# Patient Record
Sex: MALE | Race: ASIAN | NOT HISPANIC OR LATINO | Employment: FULL TIME | URBAN - METROPOLITAN AREA
[De-identification: names, ages, dates, MRNs, and addresses within clinical notes are randomized per-mention and may not be internally consistent; named-entity substitution may affect disease eponyms.]

---

## 2017-01-05 DIAGNOSIS — R06.02 SHORTNESS OF BREATH ON EXERTION: Primary | ICD-10-CM

## 2017-01-06 DIAGNOSIS — R06.02 SOB (SHORTNESS OF BREATH): Primary | ICD-10-CM

## 2017-01-18 LAB
DLCOUNC-%PRED-PRE: 90 %
DLCOUNC-PRE: 27.27 ML/MIN/MMHG
DLCOUNC-PRED: 30.11 ML/MIN/MMHG
ERV-%PRED-PRE: 88 %
ERV-PRE: 1.18 L
ERV-PRED: 1.34 L
EXPTIME-PRE: 8.36 SEC
FEF2575-%PRED-PRE: 90 %
FEF2575-PRE: 2.96 L/SEC
FEF2575-PRED: 3.28 L/SEC
FEFMAX-%PRED-PRE: 108 %
FEFMAX-PRE: 10.27 L/SEC
FEFMAX-PRED: 9.43 L/SEC
FEV1-%PRED-PRE: 96 %
FEV1-PRE: 3.32 L
FEV1FEV6-PRE: 79 %
FEV1FEV6-PRED: 81 %
FEV1FVC-PRE: 78 %
FEV1FVC-PRED: 80 %
FEV1SVC-PRE: 78 %
FEV1SVC-PRED: 71 %
FIFMAX-PRE: 2.77 L/SEC
FRCPLETH-%PRED-PRE: 83 %
FRCPLETH-PRE: 2.83 L
FRCPLETH-PRED: 3.39 L
FVC-%PRED-PRE: 98 %
FVC-PRE: 4.24 L
FVC-PRED: 4.31 L
IC-%PRED-PRE: 86 %
IC-PRE: 3.08 L
IC-PRED: 3.55 L
RVPLETH-%PRED-PRE: 78 %
RVPLETH-PRE: 1.65 L
RVPLETH-PRED: 2.11 L
TLCPLETH-%PRED-PRE: 87 %
TLCPLETH-PRE: 5.91 L
TLCPLETH-PRED: 6.72 L
VA-%PRED-PRE: 79 %
VA-PRE: 5.13 L
VC-%PRED-PRE: 87 %
VC-PRE: 4.26 L
VC-PRED: 4.89 L

## 2017-01-23 DIAGNOSIS — R06.83 SNORING: Primary | ICD-10-CM

## 2017-02-02 ENCOUNTER — OFFICE VISIT (OUTPATIENT)
Dept: SLEEP MEDICINE | Facility: CLINIC | Age: 47
End: 2017-02-02
Attending: NURSE PRACTITIONER
Payer: COMMERCIAL

## 2017-02-02 VITALS
SYSTOLIC BLOOD PRESSURE: 121 MMHG | HEART RATE: 73 BPM | BODY MASS INDEX: 24.57 KG/M2 | HEIGHT: 68 IN | RESPIRATION RATE: 16 BRPM | WEIGHT: 162.1 LBS | DIASTOLIC BLOOD PRESSURE: 76 MMHG | OXYGEN SATURATION: 98 %

## 2017-02-02 DIAGNOSIS — R06.83 SNORING: Primary | ICD-10-CM

## 2017-02-02 DIAGNOSIS — J31.0 OTHER RHINITIS: ICD-10-CM

## 2017-02-02 DIAGNOSIS — G47.8 UNHEALTHY SLEEP HABIT: ICD-10-CM

## 2017-02-02 DIAGNOSIS — F51.12 INSUFFICIENT SLEEP SYNDROME: ICD-10-CM

## 2017-02-02 PROCEDURE — 40000809 ZZH STATISTIC NO DOCUMENTATION TO SUPPORT CHARGE

## 2017-02-02 PROCEDURE — 99211 OFF/OP EST MAY X REQ PHY/QHP: CPT | Mod: ZF

## 2017-02-02 NOTE — NURSING NOTE
"Chief Complaint   Patient presents with     Consult     Snoring, possible sleep studt       Initial /76 mmHg  Pulse 73  Resp 16  Ht 1.734 m (5' 8.25\")  Wt 73.528 kg (162 lb 1.6 oz)  BMI 24.45 kg/m2  SpO2 98% Estimated body mass index is 24.45 kg/(m^2) as calculated from the following:    Height as of this encounter: 1.734 m (5' 8.25\").    Weight as of this encounter: 73.528 kg (162 lb 1.6 oz).  BP completed using cuff size: large  Left arm  Neck 38cm    Nigel CMA      "

## 2017-02-02 NOTE — PROGRESS NOTES
DATE OF VISIT:  02/02/2017.      CHIEF COMPLAINT:  Dr. Deluca has requested consultation for  Mr. Penelope Capone  for difficulties with snoring.      HISTORY OF PRESENT ILLNESS:  The patient reports years of a history with snoring and presents here today wondering if he would qualify for a mouth guard.      PAST MEDICAL HISTORY:  Previously studied at our lab on 09/28/2012 with a polysomnogram.  That study did show a weight of 156 pounds, an AHI of 1.8, a supine AHI of 5.9, an RDI of 17, a RERA index of 17.9.  PLMs were noted with a PLM index of 8.2.  This study was notable for increased N3 sleep pressure evidenced by repetitive entry into N3 sleep with 39.3% of the night's rest in N3 sleep.  Snoring was mild to moderate.  O2 sats were normal.  This study was a negative sleep study.      Currently the patient reports entering bed somewhere between 11:00 and 11:30 after having previously put his 3-year-old to bed earlier in the evening; about the same schedule on weekends.  On a rare occasion he may get to bed after midnight, but only if he has had a nap.  Awakens quite frequently at 6:00 a.m. while he is closely sleeping with his 3-year-old and then he is up for the day.  On occasion he may awaken earlier.  Also, outlier includes on rare occasion can quite easily go to bed early with his 3-year-old and sleeps throughout the night.  Total sleep time is estimated probably 6-1/2 hours for most nights, sometimes 6, feels better if he gets 8.  Will nap once a week for about 30 minutes and if he does nap, being a natural night owl, will go to bed a little bit later.      No RLS.  Main distraction, however, is his 3-year old can move around a fair amount at night awakening him.  Snoring does persist 7 nights a week.  Sometimes it is loud.  It is more loud when alcohol is congested prior to bedtime or if he is on his back.  There has been minimal reports of witnessed apneas.  He does not sleep with his wife on a regular  basis as to whether that has worsened over time.  He has a rare snort arousal, maybe once every 3 months, and a dry throat in the morning, some nasal stuffiness.  Prefers to sleep on his back.  No signs of orexin deficiency.  On occasion will have sleep paralysis, however.      SOCIAL AND PERSONAL HISTORY:  He is , lives with wife and 2 girls, a baby and a 3-year-old.  He is a professor at the [employer].  Sleep environment as mentioned before, may be disruptive to sleeping due to close quarters with his 3-year-old.  No family history of sleep abnormalities.  Does use alcohol to help fall asleep with a bourbon prior to bedtime and during his relaxing time after putting his child to bed might have a couple of bottles of beer as well.  These do occur within 3-4 hours of bedtime.  Caffeine, but minimal intake.  His [place] Sleepiness Score is 12-13.  He denies any effects on his performance with work or difficulties with driving.  If he does do a long drive; however, he may have some daytime sleepiness.  No tiredness or fatigue during the day or any problems with mental health.       REVIEW OF SYSTEMS:  A 14-point review of systems completed and negative with the exception of some nasal stuffiness and mucous in the morning after snoring at night.      Allergies:    Allergies   Allergen Reactions     Food      White wine.     Shellfish Allergy      Adhesive Tape Itching and Rash       Medications:    No current outpatient prescriptions on file.       Problem List:  Patient Active Problem List    Diagnosis Date Noted     Left arm numbness 06/26/2015     CTS (carpal tunnel syndrome) 12/12/2014     Preventative health care 07/20/2014     Psoriasis 03/05/2012     Hives 03/05/2012        Past Medical/Surgical History:  Past Medical History   Diagnosis Date     Tinea cruris      Urticaria      Dysplastic nevus      2015     No past surgical history on file.        Physical Examination:  Vitals: /76 mmHg  Pulse 73   "Resp 16  Ht 1.734 m (5' 8.25\")  Wt 73.528 kg (162 lb 1.6 oz)  BMI 24.45 kg/m2  SpO2 98%  BMI= Body mass index is 24.45 kg/(m^2).      [place] Total Score 2/2/2017   Total score - [place] 13       GENERAL APPEARANCE: healthy, alert and no distress     ASSESSMENT AND PLAN:  It is my impression that Mr. Capone still presents a case of snoring and likely infrequent apneas, worse when he is supine.  Elevated [place] Sleepiness Score but denies difficulties with cognitive performance at work; impact from late night alcohol prior to bedtime, insufficient sleep, along with co-sleeping with a 3 year old likely playing a major role here.  Weight up by 6 pounds from previous study, unlikely making an impact on the degree of sleep disordered breathing. His greatest current concern is travel and sharing bed with spouse with loud snoring. The following plan of care has been developed:   1.  Snoring, possible worsening of sleep apnea, but it is difficult to determine at this point without significant fragmented sleep and in the setting of insufficient sleep and impact of sleep habits.  I have recommended that he do a trial for 2 months period of time, eliminating alcohol within 3-4 hours of bedtime and enhancing his total sleep time to adult norms between 7-9 hours.  He is to MyChart me in 2 months. He's provided with a referral to Dr. Perez for consideration of a travel dental appliance.  2.  Insufficient sleep by his history today and likely the answer for increased slow wave pressure at his last sleep study.  I have recommended that he increase total sleep time.   3.  Rhinitis.  I have instructed him in saline lavages.           Thank you for allowing me to participate in this kind man's care.      Time spent with patient 60 minutes, of which greater than 50% was spent in counseling, education and coordination of care.         DENZEL SHARPE, CNP             D: 02/02/2017 11:27   T: 02/02/2017 11:56   MT: BRANDON    "   Name:     PHILLIP BLUE   MRN:      [id number]        Account:      [id number]   :      1970           Visit Date:   2017      Document: [id number]

## 2017-02-02 NOTE — PATIENT INSTRUCTIONS
Positioning Device  Positioning devices are generally used when sleep apnea is mild and only occurs on your back.This example shows a pillow that straps around the waist. It may be appropriate for those whose sleep study shows milder sleep apnea that occurs primarily when lying flat on one's back. Preliminary studies have shown benefit but effectiveness at home may need to be verified by a home sleep test. These devices are generally not covered by medical insurance.                ebay or backpack w/ chest strap stuffed w/ pillow or t shirt 2  pockets sew in tennis balls      Also, avoid alcohol within 3-4 hrs of bedtime    Use saline spray product (ocean complete or arm and hammer are easy to use) in shower where nose naturally opens up. Use another time of day with continue congestion over the sink.    My chart me in 2 months.     Ricardo Perez DDS, for travel appliance for snoring        Your BMI is Body mass index is 24.45 kg/(m^2).  Weight management is a personal decision.  If you are interested in exploring weight loss strategies, the following discussion covers the approaches that may be successful. Body mass index (BMI) is one way to tell whether you are at a healthy weight, overweight, or obese. It measures your weight in relation to your height.  A BMI of 18.5 to 24.9 is in the healthy range. A person with a BMI of 25 to 29.9 is considered overweight, and someone with a BMI of 30 or greater is considered obese. More than two-thirds of American adults are considered overweight or obese.  Being overweight or obese increases the risk for further weight gain. Excess weight may lead to heart disease and diabetes.  Creating and following plans for healthy eating and physical activity may help you improve your health.  Weight control is part of healthy lifestyle and includes exercise, emotional health, and healthy eating habits. Careful eating habits lifelong are the mainstay of weight control. Though there  are significant health benefits from weight loss, long-term weight loss with diet alone may be very difficult to achieve- studies show long-term success with dietary management in less than 10% of people. Attaining a healthy weight may be especially difficult to achieve in those with severe obesity. In some cases, medications, devices and surgical management might be considered.  What can you do?  If you are overweight or obese and are interested in methods for weight loss, you should discuss this with your provider.     Consider reducing daily calorie intake by 500 calories.     Keep a food journal.     Avoiding skipping meals, consider cutting portions instead.    Diet combined with exercise helps maintain muscle while optimizing fat loss. Strength training is particularly important for building and maintaining muscle mass. Exercise helps reduce stress, increase energy, and improves fitness. Increasing exercise without diet control, however, may not burn enough calories to loose weight.       Start walking three days a week 10-20 minutes at a time    Work towards walking thirty minutes five days a week     Eventually, increase the speed of your walking for 1-2 minutes at time    In addition, we recommend that you review healthy lifestyles and methods for weight loss available through the National Institutes of Health patient information sites:  http://win.niddk.nih.gov/publications/index.htm    And look into health and wellness programs that may be available through your health insurance provider, employer, local community center, or kang club.

## 2017-03-16 ENCOUNTER — PRE VISIT (OUTPATIENT)
Dept: PULMONOLOGY | Facility: CLINIC | Age: 47
End: 2017-03-16

## 2017-03-16 NOTE — TELEPHONE ENCOUNTER
1.  Date/reason for appt: 3/29/17 - Shortness of breath (PFT and chest XR prior)  2.  Referring provider: Dr. Tyron Vides  3.  Call to patient (Yes / No - short description): no, recs in epic  4.  Previous care at:   - [facility]   - PFT on 1/6/17 (in [facility])

## 2017-03-29 ENCOUNTER — OFFICE VISIT (OUTPATIENT)
Dept: PULMONOLOGY | Facility: CLINIC | Age: 47
End: 2017-03-29
Attending: INTERNAL MEDICINE
Payer: COMMERCIAL

## 2017-03-29 VITALS
DIASTOLIC BLOOD PRESSURE: 88 MMHG | HEART RATE: 61 BPM | HEIGHT: 68 IN | RESPIRATION RATE: 18 BRPM | BODY MASS INDEX: 24.71 KG/M2 | SYSTOLIC BLOOD PRESSURE: 133 MMHG | TEMPERATURE: 98 F | WEIGHT: 163 LBS | OXYGEN SATURATION: 97 %

## 2017-03-29 DIAGNOSIS — R06.02 SOB (SHORTNESS OF BREATH): Primary | ICD-10-CM

## 2017-03-29 DIAGNOSIS — R06.02 SOB (SHORTNESS OF BREATH): ICD-10-CM

## 2017-03-29 PROCEDURE — 99212 OFFICE O/P EST SF 10 MIN: CPT | Mod: ZF

## 2017-03-29 ASSESSMENT — PAIN SCALES - GENERAL: PAINLEVEL: NO PAIN (0)

## 2017-03-29 NOTE — NURSING NOTE
Chief Complaint   Patient presents with     Consult     New consult on Tetsuya and his results     Eugene Lerma CMA at 2:33 PM on 3/29/2017

## 2017-03-29 NOTE — LETTER
"3/29/2017       RE: Penelope Capone  [address]  [facility] [address]     Dear Colleague,    Thank you for referring your patient, Penelope Capoen, to the [facility] at [facility]. Please see a copy of my visit note below.              Pulmonary Clinic  New Patient Evaluation    Name: Penelope Capone MRN: [id number]     Age: 47 year old   YOB: 1970                 HPI:   CC: \"Concern for silicosis\"    Penelope Capone is a 47 year old male in generally good health presents to discuss pulmonary silicosis.    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 He's noted that he occasionally has a tight feeling in his chest after breathing in sawdust. This is not associated with dyspnea, cough, or wheezing. Wood is not his primary medium in his exposure to sawdust is in frequent. He does wear a mask when working with wood, which is generally plywood. He also mentions that his studio has high-end industrial air filters to minimize dust levels.  His primary media is Keron, and he does have frequent exposure to dust from dried keron. No other recognized exposures.  He also states that he feels short of breath after running a few blocks, though he notes that he is not very active and this is likely due to deconditioning.    He says that he is overall minimally concerned for lung disease but one of his colleagues was recently diagnosed with silicosis and has substantial respiratory trouble so Mr. Capone wanted to be checked out to ensure that he is not at risk.    Mr. Capone has smoked one third to one half pack per day for 20 years, he quit 10 years ago.    He has a history of positive PPD status post nine months of isoniazid therapy. He did receive the BCG vaccine is a child so it is unclear if this PPD was a true positive.             Past Medical History:     Past Medical History:   Diagnosis Date     Dysplastic nevus     2015     " Tinea cruris      Urticaria              Past Surgical History:      Past Surgical History:   Procedure Laterality Date     NO HISTORY OF SURGERY               Social History:     Social History     Social History     Marital status:      Spouse name: N/A     Number of children: N/A     Years of education: N/A     Occupational History     Not on file.     Social History Main Topics     Smoking status: Former Smoker     Packs/day: 0.30     Years: 18.00     Types: Cigarettes     Quit date: 1/1/2006     Smokeless tobacco: Never Used     Alcohol use Yes      Comment: 2-3 per day     Drug use: No     Sexual activity: Yes     Partners: Female     Birth control/ protection: Condom     Other Topics Concern     Parent/Sibling W/ Cabg, Mi Or Angioplasty Before 65f 55m? No     Social History Narrative              Family History:     Family History   Problem Relation Age of Onset     CANCER No family hx of      no skin cancer             Immunizations:     Immunization History   Administered Date(s) Administered     Influenza (IIV3) 11/10/2005     Influenza Vaccine IM 3yrs+ 4 Valent IIV4 11/14/2013, 10/20/2015     Influenza Vaccine, 3 YRS +, IM (QUADRIVALENT W/PRESERVATIVES) 10/27/2014     MMR 08/30/2001     TD (ADULT, 7+) 04/12/1996     Tdap (Adacel,Boostrix) 05/18/2010             Allergies:     Allergies   Allergen Reactions     Food      White wine.     Shellfish Allergy      Adhesive Tape Itching and Rash             Medications:     Not on any medicatons         Review of Systems:     Review of Systems   Constitutional: Negative for chills, fever and weight loss.   HENT: Negative for congestion.    Eyes: Negative.    Respiratory: Negative for cough, sputum production, shortness of breath and wheezing.    Cardiovascular: Negative for chest pain and leg swelling.   Gastrointestinal: Negative for heartburn and nausea.   Genitourinary: Negative for dysuria.   Musculoskeletal: Negative.    Skin: Negative for rash.  "  Neurological: Negative for loss of consciousness and headaches.   Endo/Heme/Allergies: Does not bruise/bleed easily.              Exam:   /88  Pulse 61  Temp 98  F (36.7  C) (Oral)  Resp 18  Ht 1.734 m (5' 8.25\")  Wt 73.9 kg (163 lb)  SpO2 97%  BMI 24.6 kg/m2    Physical Exam   Constitutional: He is oriented to person, place, and time and well-developed, well-nourished, and in no distress. No distress.   HENT:   Head: Normocephalic and atraumatic.   Right Ear: External ear normal.   Left Ear: External ear normal.   Mouth/Throat: No oropharyngeal exudate.   Eyes: Conjunctivae and EOM are normal. Pupils are equal, round, and reactive to light.   Neck: Normal range of motion.   Cardiovascular: Normal rate and regular rhythm.    No murmur heard.  Pulmonary/Chest: Effort normal. He has no wheezes. He has no rales.   Abdominal: Soft.   Musculoskeletal: Normal range of motion. He exhibits no edema.   Lymphadenopathy:     He has no cervical adenopathy.   Neurological: He is alert and oriented to person, place, and time.   Skin: Skin is warm and dry.   Nursing note and vitals reviewed.             Data:     PFT 3/29/2017        Interpretation by me: Normal airflow. Normal DLCO      CXR: 3/29/17  Images personally reviewed.  Radiologist Read: No acute cardiopulmonary findings.         Assessment and Plan:     Penelope Capone is a 47 year old male in generally good health presents to discuss pulmonary silicosis.    ## Concern for Occupational Lung Disease    Mr. Capone notes occasional chest tightness after breathing a lot of sawdust. This doesn't really cause any problems, though due to an older colleague who is also an artist who works with wood and ti being diagnosed with silicosis, Mr. Capone was concerned that this feeling could be a sign of silicosis. He has no other respiratory symptoms. He does note mild dyspnea when he tries to run, though he is not a runner and is not very active overall so this is " not unexpected. His PFTs are completely normal as it is a chest x-ray. These results were reviewed with Mr. Capone. I explained that chest CT would be more accurate than chest x-ray to assess silicosis or other inhalation a lung disease, however given his normal PFTs and lack of symptoms I do not think further imaging is warranted. He agreed with this.  In summary there are no signs or symptoms of any lung disease at this time. His mild dyspnea with strenuous exertion is normal for an inactive person. He's already taking appropriate respiratory precautions by wearing a mask when working with sawdust or doing anything that will generate ti dust and has a high performance air filtration system in his studio.        Patient staffed with Dr. Adria Mares M.D.  Pulmonary & Critical Care Fellow  [phone]    Attending statement:    The patient was seen and examined by me with the pulmonary fellow, Dr Mares.  The case was discussed at length.  Vitals, lab results and imaging from our visit were reviewed.  The note written by Dr Mares above reflects our joint assessment and plan.    Alexander Covington MD    Sincerely,    Bi Mares MD

## 2017-03-29 NOTE — MR AVS SNAPSHOT
"              After Visit Summary   3/29/2017    Penelope Capone    MRN: [id number]           Patient Information     Date Of Birth          1970        Visit Information        Provider Department      3/29/2017 2:10 PM Bi Mares MD [facility]         Follow-ups after your visit        Follow-up notes from your care team     Return if symptoms worsen or fail to improve.      Who to contact     If you have questions or need follow up information about today's clinic visit or your schedule please contact [facility] L[facility] directly at [phone].  Normal or non-critical lab and imaging results will be communicated to you by MyChart, letter or phone within 4 business days after the clinic has received the results. If you do not hear from us within 7 days, please contact the clinic through [email or web address] or phone. If you have a critical or abnormal lab result, we will notify you by phone as soon as possible.  Submit refill requests through [employer] or call your pharmacy and they will forward the refill request to us. Please allow 3 business days for your refill to be completed.          Additional Information About Your Visit        MyChart Information     [employer] gives you secure access to your electronic health record. If you see a primary care provider, you can also send messages to your care team and make appointments. If you have questions, please call your primary care clinic.  If you do not have a primary care provider, please call [phone] and they will assist you.        Care EveryWhere ID     This is your Care EveryWhere ID. This could be used by other organizations to access your [place] medical records  [id number]        Your Vitals Were     Pulse Temperature Respirations Height Pulse Oximetry BMI (Body Mass Index)    61 98  F (36.7  C) (Oral) 18 1.734 m (5' 8.25\") 97% 24.6 kg/m2       Blood Pressure from Last 3 Encounters:   03/29/17 " 133/88   02/02/17 121/76   12/14/15 119/77    Weight from Last 3 Encounters:   03/29/17 73.9 kg (163 lb)   02/02/17 73.5 kg (162 lb 1.6 oz)   12/14/15 70.8 kg (156 lb 0.6 oz)              Today, you had the following     No orders found for display       Primary Care Provider Office Phone # Fax #    Tyron Vides [phone]       [address] [address]        Thank you!     Thank you for choosing [facility]  for your care. Our goal is always to provide you with excellent care. Hearing back from our patients is one way we can continue to improve our services. Please take a few minutes to complete the written survey that you may receive in the mail after your visit with us. Thank you!             Your Updated Medication List - Protect others around you: Learn how to safely use, store and throw away your medicines at www.disposemymeds.org.      Notice  As of 3/29/2017  3:29 PM    You have not been prescribed any medications.

## 2017-03-29 NOTE — PROGRESS NOTES
"          Pulmonary Clinic  New Patient Evaluation    Name: Penelope Capone MRN: [id number]     Age: 47 year old   YOB: 1970                 HPI:   CC: \"Concern for silicosis\"    Penelope Capone is a 47 year old male in generally good health presents to discuss pulmonary silicosis.    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 He's noted that he occasionally has a tight feeling in his chest after breathing in sawdust. This is not associated with dyspnea, cough, or wheezing. Wood is not his primary medium in his exposure to sawdust is in frequent. He does wear a mask when working with wood, which is generally plywood. He also mentions that his studio has high-end industrial air filters to minimize dust levels.  His primary media is Keron, and he does have frequent exposure to dust from dried keron. No other recognized exposures.  He also states that he feels short of breath after running a few blocks, though he notes that he is not very active and this is likely due to deconditioning.    He says that he is overall minimally concerned for lung disease but one of his colleagues was recently diagnosed with silicosis and has substantial respiratory trouble so Mr. Capone wanted to be checked out to ensure that he is not at risk.    Mr. Capone has smoked one third to one half pack per day for 20 years, he quit 10 years ago.    He has a history of positive PPD status post nine months of isoniazid therapy. He did receive the BCG vaccine is a child so it is unclear if this PPD was a true positive.             Past Medical History:     Past Medical History:   Diagnosis Date     Dysplastic nevus     2015     Tinea cruris      Urticaria              Past Surgical History:      Past Surgical History:   Procedure Laterality Date     NO HISTORY OF SURGERY               Social History:     Social History     Social History     Marital status:      Spouse name: N/A     Number of children: N/A     Years of " "education: N/A     Occupational History     Not on file.     Social History Main Topics     Smoking status: Former Smoker     Packs/day: 0.30     Years: 18.00     Types: Cigarettes     Quit date: 1/1/2006     Smokeless tobacco: Never Used     Alcohol use Yes      Comment: 2-3 per day     Drug use: No     Sexual activity: Yes     Partners: Female     Birth control/ protection: Condom     Other Topics Concern     Parent/Sibling W/ Cabg, Mi Or Angioplasty Before 65f 55m? No     Social History Narrative              Family History:     Family History   Problem Relation Age of Onset     CANCER No family hx of      no skin cancer             Immunizations:     Immunization History   Administered Date(s) Administered     Influenza (IIV3) 11/10/2005     Influenza Vaccine IM 3yrs+ 4 Valent IIV4 11/14/2013, 10/20/2015     Influenza Vaccine, 3 YRS +, IM (QUADRIVALENT W/PRESERVATIVES) 10/27/2014     MMR 08/30/2001     TD (ADULT, 7+) 04/12/1996     Tdap (Adacel,Boostrix) 05/18/2010             Allergies:     Allergies   Allergen Reactions     Food      White wine.     Shellfish Allergy      Adhesive Tape Itching and Rash             Medications:     Not on any medicatons         Review of Systems:     Review of Systems   Constitutional: Negative for chills, fever and weight loss.   HENT: Negative for congestion.    Eyes: Negative.    Respiratory: Negative for cough, sputum production, shortness of breath and wheezing.    Cardiovascular: Negative for chest pain and leg swelling.   Gastrointestinal: Negative for heartburn and nausea.   Genitourinary: Negative for dysuria.   Musculoskeletal: Negative.    Skin: Negative for rash.   Neurological: Negative for loss of consciousness and headaches.   Endo/Heme/Allergies: Does not bruise/bleed easily.              Exam:   /88  Pulse 61  Temp 98  F (36.7  C) (Oral)  Resp 18  Ht 1.734 m (5' 8.25\")  Wt 73.9 kg (163 lb)  SpO2 97%  BMI 24.6 kg/m2    Physical Exam "   Constitutional: He is oriented to person, place, and time and well-developed, well-nourished, and in no distress. No distress.   HENT:   Head: Normocephalic and atraumatic.   Right Ear: External ear normal.   Left Ear: External ear normal.   Mouth/Throat: No oropharyngeal exudate.   Eyes: Conjunctivae and EOM are normal. Pupils are equal, round, and reactive to light.   Neck: Normal range of motion.   Cardiovascular: Normal rate and regular rhythm.    No murmur heard.  Pulmonary/Chest: Effort normal. He has no wheezes. He has no rales.   Abdominal: Soft.   Musculoskeletal: Normal range of motion. He exhibits no edema.   Lymphadenopathy:     He has no cervical adenopathy.   Neurological: He is alert and oriented to person, place, and time.   Skin: Skin is warm and dry.   Nursing note and vitals reviewed.             Data:     PFT 3/29/2017        Interpretation by me: Normal airflow. Normal DLCO      CXR: 3/29/17  Images personally reviewed.  Radiologist Read: No acute cardiopulmonary findings.         Assessment and Plan:     Penelope Capone is a 47 year old male in generally good health presents to discuss pulmonary silicosis.    ## Concern for Occupational Lung Disease    Mr. Capone notes occasional chest tightness after breathing a lot of sawdust. This doesn't really cause any problems, though due to an older colleague who is also an artist who works with wood and ti being diagnosed with silicosis, Mr. Capone was concerned that this feeling could be a sign of silicosis. He has no other respiratory symptoms. He does note mild dyspnea when he tries to run, though he is not a runner and is not very active overall so this is not unexpected. His PFTs are completely normal as it is a chest x-ray. These results were reviewed with Mr. Capone. I explained that chest CT would be more accurate than chest x-ray to assess silicosis or other inhalation a lung disease, however given his normal PFTs and lack of symptoms I do  not think further imaging is warranted. He agreed with this.  In summary there are no signs or symptoms of any lung disease at this time. His mild dyspnea with strenuous exertion is normal for an inactive person. He's already taking appropriate respiratory precautions by wearing a mask when working with sawdust or doing anything that will generate ti dust and has a high performance air filtration system in his studio.        Patient staffed with Dr. Adria Mares M.D.  Pulmonary & Critical Care Fellow  [phone]    Attending statement:    The patient was seen and examined by me with the pulmonary fellow, Dr Mares.  The case was discussed at length.  Vitals, lab results and imaging from our visit were reviewed.  The note written by Dr Mares above reflects our joint assessment and plan.    Alexander Covington MD

## 2017-04-07 ASSESSMENT — ENCOUNTER SYMPTOMS
NAUSEA: 0
SPUTUM PRODUCTION: 0
SHORTNESS OF BREATH: 0
CHILLS: 0
DYSURIA: 0
COUGH: 0
HEADACHES: 0
WHEEZING: 0
HEARTBURN: 0
WEIGHT LOSS: 0
BRUISES/BLEEDS EASILY: 0
FEVER: 0
LOSS OF CONSCIOUSNESS: 0
EYES NEGATIVE: 1
MUSCULOSKELETAL NEGATIVE: 1

## 2017-04-19 LAB
DLCOUNC-%PRED-PRE: 101 %
DLCOUNC-PRE: 30.38 ML/MIN/MMHG
DLCOUNC-PRED: 30.06 ML/MIN/MMHG
ERV-%PRED-PRE: 84 %
ERV-PRE: 1.16 L
ERV-PRED: 1.38 L
EXPTIME-PRE: 7.87 SEC
FEF2575-%PRED-PRE: 95 %
FEF2575-PRE: 3.11 L/SEC
FEF2575-PRED: 3.27 L/SEC
FEFMAX-%PRED-PRE: 115 %
FEFMAX-PRE: 10.91 L/SEC
FEFMAX-PRED: 9.42 L/SEC
FEV1-%PRED-PRE: 99 %
FEV1-PRE: 3.44 L
FEV1FEV6-PRE: 79 %
FEV1FEV6-PRED: 81 %
FEV1FVC-PRE: 79 %
FEV1FVC-PRED: 80 %
FEV1SVC-PRE: 81 %
FEV1SVC-PRED: 71 %
FIFMAX-PRE: 6.53 L/SEC
FRCPLETH-%PRED-PRE: 86 %
FRCPLETH-PRE: 2.95 L
FRCPLETH-PRED: 3.39 L
FVC-%PRED-PRE: 101 %
FVC-PRE: 4.35 L
FVC-PRED: 4.3 L
IC-%PRED-PRE: 87 %
IC-PRE: 3.07 L
IC-PRED: 3.51 L
RVPLETH-%PRED-PRE: 84 %
RVPLETH-PRE: 1.78 L
RVPLETH-PRED: 2.12 L
TLCPLETH-%PRED-PRE: 89 %
TLCPLETH-PRE: 6.02 L
TLCPLETH-PRED: 6.72 L
VA-%PRED-PRE: 88 %
VA-PRE: 5.67 L
VC-%PRED-PRE: 86 %
VC-PRE: 4.23 L
VC-PRED: 4.89 L

## 2017-04-26 ENCOUNTER — OFFICE VISIT (OUTPATIENT)
Dept: FAMILY MEDICINE | Facility: CLINIC | Age: 47
End: 2017-04-26

## 2017-04-26 VITALS
HEART RATE: 63 BPM | WEIGHT: 162 LBS | TEMPERATURE: 97.6 F | DIASTOLIC BLOOD PRESSURE: 87 MMHG | SYSTOLIC BLOOD PRESSURE: 127 MMHG | OXYGEN SATURATION: 99 % | BODY MASS INDEX: 24.45 KG/M2

## 2017-04-26 DIAGNOSIS — B35.6 TINEA CRURIS: Primary | ICD-10-CM

## 2017-04-26 RX ORDER — CICLOPIROX OLAMINE 7.7 MG/G
CREAM TOPICAL 2 TIMES DAILY
Qty: 30 G | Refills: 1 | Status: SHIPPED | OUTPATIENT
Start: 2017-04-26 | End: 2022-04-25

## 2017-04-26 NOTE — NURSING NOTE
"Chief Complaint   Patient presents with     RECHECK     Patient is coming in today wanting to make sure that he has jock itch and nothing else - redness, itching, swelling - started while in [place] April 10-13       Initial /87 (BP Location: Right arm, Cuff Size: Adult Regular)  Pulse 63  Temp 97.6  F (36.4  C) (Oral)  Wt 162 lb (73.5 kg)  SpO2 99%  BMI 24.45 kg/m2 Estimated body mass index is 24.45 kg/(m^2) as calculated from the following:    Height as of 3/29/17: 5' 8.25\" (173.4 cm).    Weight as of this encounter: 162 lb (73.5 kg).  Medication Reconciliation: complete     Marry Son CMA      "

## 2017-04-26 NOTE — PROGRESS NOTES
Penelope Capone is a 47 year old male here for the following issues:    Rash  Penelope is a 48 yo male here for evaluation of groin rash. In the past he has tried gentian blue in the past, but this time it did not help. He reports that he had traveled to [place] 2 wks ago and it was very hot there. He did a lot of walking and was sweating. Then he noted onset of redness in his groin and itching. Axillae were also affected. No fevers. No urinary symptoms, no concern for STDs.     Concern for celiac disease  He reports getting diarrhea with ingestion of pasta, breads  Can eat bread that is made with malt instead of yeast. He is not currently ingesting gluten.    Patient Active Problem List   Diagnosis     Psoriasis     Hives     Preventative health care     CTS (carpal tunnel syndrome)     Left arm numbness       No current outpatient prescriptions on file.       Allergies   Allergen Reactions     Food      White wine.     Shellfish Allergy      Adhesive Tape Itching and Rash        EXAM  /87 (BP Location: Right arm, Cuff Size: Adult Regular)  Pulse 63  Temp 97.6  F (36.4  C) (Oral)  Wt 162 lb (73.5 kg)  SpO2 99%  BMI 24.45 kg/m2  Gen: Alert, pleasant, NAD  : generalized erythema at inguinal creases and erythematous satellite lesions adjacent to this area. At base of penis is erythematous open lesion measuring about quarter size. Clear serosanguinous drainage   Axillae: confluent erythema at both axillae. No change in color under blue light    Assessment:  (B35.6) Tinea cruris  (primary encounter diagnosis)  Comment: open lesion at base of penis. Erythema in inguinal creases, satellite lesions, suspect fungal infection, discussed concern for infection but he declines oral antibiotics  Plan: ciclopirox (LOPROX) 0.77 % cream        Recommend topical bacitracin over open area, use ciclopirox bid at creases and at axilla. Contact MD if not improving as I would consider oral antibiotics.     Patient Instructions    First use Bacitracin (antibiotic ointment) 2-3 x per day  Also buy 1% hydrocortisone ointment and apply twice daily to open area    Start Ciclopirox cream twice daily on Sat /Sunday    Genesis Brantley MD  Internal Medicine/Pediatrics

## 2017-04-26 NOTE — PATIENT INSTRUCTIONS
First use Bacitracin (antibiotic ointment) 2-3 x per day  Also buy 1% hydrocortisone ointment and apply twice daily to open area    Start Ciclopirox cream twice daily on Sat /Sunday

## 2018-01-14 ENCOUNTER — MYC MEDICAL ADVICE (OUTPATIENT)
Dept: FAMILY MEDICINE | Facility: CLINIC | Age: 48
End: 2018-01-14

## 2018-01-14 DIAGNOSIS — Z12.11 SCREENING FOR COLON CANCER: Primary | ICD-10-CM

## 2018-01-15 ENCOUNTER — TELEPHONE (OUTPATIENT)
Dept: GASTROENTEROLOGY | Facility: CLINIC | Age: 48
End: 2018-01-15

## 2018-01-15 DIAGNOSIS — Z12.11 ENCOUNTER FOR SCREENING COLONOSCOPY: Primary | ICD-10-CM

## 2018-01-15 NOTE — TELEPHONE ENCOUNTER
We can go ahead and order this for him.     Normally, I might try to discuss options with him (like a FIT card), but he's pretty nervous about his health.     So, an order is fine.     Thanks!     Tyron        Order placed for screening colonoscopy at [facility]

## 2018-01-15 NOTE — TELEPHONE ENCOUNTER
Patient scheduled for colonoscopy    Indication for procedure. screening    Referring Provider. Dr. Vides, [facility]    ? no    Arrival time verified? Yes 12 noon    Facility location verified? [address] [place] [address]    Instructions given regarding prep and procedure    Prep Type golytley    Are you taking any anticoagulants or blood thinners? no    Instructions given? Yes, verbally and email    Electronic implanted devices? no    Pre procedure teaching completed? Yes    Transportation from procedure? Yes, wife    H&P / Pre op physical completed? Na    Kathie Rodriguez RN

## 2018-01-23 ENCOUNTER — SURGERY (OUTPATIENT)
Age: 48
End: 2018-01-23

## 2018-01-23 ENCOUNTER — HOSPITAL ENCOUNTER (OUTPATIENT)
Facility: AMBULATORY SURGERY CENTER | Age: 48
End: 2018-01-23
Attending: INTERNAL MEDICINE
Payer: COMMERCIAL

## 2018-01-23 VITALS
TEMPERATURE: 97.7 F | HEIGHT: 69 IN | RESPIRATION RATE: 16 BRPM | DIASTOLIC BLOOD PRESSURE: 67 MMHG | OXYGEN SATURATION: 97 % | SYSTOLIC BLOOD PRESSURE: 138 MMHG

## 2018-01-23 RX ORDER — FENTANYL CITRATE 50 UG/ML
INJECTION, SOLUTION INTRAMUSCULAR; INTRAVENOUS PRN
Status: DISCONTINUED | OUTPATIENT
Start: 2018-01-23 | End: 2018-01-23 | Stop reason: HOSPADM

## 2018-01-23 RX ORDER — SODIUM CHLORIDE, SODIUM LACTATE, POTASSIUM CHLORIDE, CALCIUM CHLORIDE 600; 310; 30; 20 MG/100ML; MG/100ML; MG/100ML; MG/100ML
INJECTION, SOLUTION INTRAVENOUS CONTINUOUS
Status: DISCONTINUED | OUTPATIENT
Start: 2018-01-23 | End: 2018-01-24 | Stop reason: HOSPADM

## 2018-01-23 RX ORDER — ONDANSETRON 2 MG/ML
4 INJECTION INTRAMUSCULAR; INTRAVENOUS
Status: DISCONTINUED | OUTPATIENT
Start: 2018-01-23 | End: 2018-01-24 | Stop reason: HOSPADM

## 2018-01-23 RX ORDER — LIDOCAINE 40 MG/G
CREAM TOPICAL
Status: DISCONTINUED | OUTPATIENT
Start: 2018-01-23 | End: 2018-01-24 | Stop reason: HOSPADM

## 2018-01-23 RX ADMIN — FENTANYL CITRATE 50 MCG: 50 INJECTION, SOLUTION INTRAMUSCULAR; INTRAVENOUS at 13:25

## 2018-01-23 NOTE — DISCHARGE INSTRUCTIONS
Discharge Instructions after Colonoscopy or Sigmoidoscopy       Today you had a ____ Colonoscopy        Activity and Diet   You were given medicine for pain. You may be dizzy or sleepy.   For 24 hours:     Do not drive or use heavy equipment.     Do not make important decisions.     Do not drink any alcohol.   You may return to your normal diet and medicines.       Discomfort     Air was placed in your colon during the exam in order to see it. Walking helps to pass the air.     You may take Tylenol (acetaminophen) for pain unless your doctor has told you not to.   Do not take aspirin or ibuprofen (Advil, Motrin, or other anti-inflammatory   drugs) for _____ days.       Follow-up   ____ We took small tissue samples or polyps to study. Your doctor will call you with the results within two weeks.       When to call:       Call right away if you have:     Unusual pain in belly or chest pain not relieved with passing air.     More than 1 to 2 Tablespoons of bleeding from your rectum.     Fever above 100.6  F (37.5  C).       If you have severe pain, bleeding, or shortness of breath, go to an emergency room.       If you have questions, call:   Monday to Friday, 7 a.m. to 4:30 p.m.   Endoscopy: [phone] (We may have to call you back)       After hours   Hospital: [phone] (Ask for the GI fellow on call)

## 2018-01-23 NOTE — IP AVS SNAPSHOT
MRN:[id number]                      After Visit Summary   1/23/2018    Penelope Capone    MRN: [id number]           Thank you!     Thank you for choosing [facility] for your care. Our goal is always to provide you with excellent care. Hearing back from our patients is one way we can continue to improve our services. Please take a few minutes to complete the written survey that you may receive in the mail after you visit with us. Thank you!        Patient Information     Date Of Birth          1970        About your hospital stay     You were admitted on:  January 23, 2018 [facility]    You were discharged on:  January 23, 2018       Who to Call     For medical emergencies, please call 911.  For non-urgent questions about your medical care, please call your primary care provider or clinic, [phone]  For questions related to your surgery, please call your surgery clinic        Attending Provider     Provider Antonia Fulton MD Gastroenterology       Primary Care Provider Office Phone # Fax #    Tyron Vides [phone]      Further instructions from your care team       Discharge Instructions after Colonoscopy or Sigmoidoscopy       Today you had a ____ Colonoscopy        Activity and Diet   You were given medicine for pain. You may be dizzy or sleepy.   For 24 hours:     Do not drive or use heavy equipment.     Do not make important decisions.     Do not drink any alcohol.   You may return to your normal diet and medicines.       Discomfort     Air was placed in your colon during the exam in order to see it. Walking helps to pass the air.     You may take Tylenol (acetaminophen) for pain unless your doctor has told you not to.   Do not take aspirin or ibuprofen (Advil, Motrin, or other anti-inflammatory   drugs) for _____ days.       Follow-up   ____ We took small tissue samples or polyps to study.  "Your doctor will call you with the results within two weeks.       When to call:       Call right away if you have:     Unusual pain in belly or chest pain not relieved with passing air.     More than 1 to 2 Tablespoons of bleeding from your rectum.     Fever above 100.6  F (37.5  C).       If you have severe pain, bleeding, or shortness of breath, go to an emergency room.       If you have questions, call:   Monday to Friday, 7 a.m. to 4:30 p.m.   Endoscopy: [phone] (We may have to call you back)       After hours   Hospital: [phone] (Ask for the GI fellow on call)     Pending Results     No orders found from 1/21/2018 to 1/24/2018.            Admission Information     Date & Time Provider Department Dept. Phone    1/23/2018 Antonia Adam MD [facility] [phone]      Your Vitals Were     Blood Pressure Temperature Respirations Height Pulse Oximetry       138/67 97.7  F (36.5  C) (Temporal) 16 1.76 m (5' 9.29\") 97%       MyChart Information     [employer] gives you secure access to your electronic health record. If you see a primary care provider, you can also send messages to your care team and make appointments. If you have questions, please call your primary care clinic.  If you do not have a primary care provider, please call [phone] and they will assist you.      [employer] is an electronic gateway that provides easy, online access to your medical records. With [employer], you can request a clinic appointment, read your test results, renew a prescription or communicate with your care team.     To access your existing account, please contact your [facility] or call [phone] for assistance.        Care EveryWhere ID     This is your Care EveryWhere ID. This could be used by other organizations to access your [facility] medical records  [id number]        Equal Access to Services     MONA SEN AH: roger Quispe " dilcia friedmanblas morissalas, keturah mkkate brockhilda ah. So [facility] [phone].    ATENCIÓN: Si pamela moore, tiene a ayers disposición servicios gratuitos de asistencia lingüística. Llame al [phone].    We comply with applicable federal civil rights laws and [place] laws. We do not discriminate on the basis of race, color, national origin, age, disability, sex, sexual orientation, or gender identity.               Review of your medicines      UNREVIEWED medicines. Ask your doctor about these medicines        Dose / Directions    ciclopirox 0.77 % cream   Commonly known as:  LOPROX   Used for:  Tinea cruris        Apply topically 2 times daily   Quantity:  30 g   Refills:  1                Protect others around you: Learn how to safely use, store and throw away your medicines at www.disposemymeds.org.             Medication List: This is a list of all your medications and when to take them. Check marks below indicate your daily home schedule. Keep this list as a reference.      Medications           Morning Afternoon Evening Bedtime As Needed    ciclopirox 0.77 % cream   Commonly known as:  LOPROX   Apply topically 2 times daily

## 2018-01-23 NOTE — IP AVS SNAPSHOT
[facility]    [address] [place] [address]    Phone:  [phone]    Fax:  [phone]                                       After Visit Summary   1/23/2018    Penelope Capone    MRN: [id number]           After Visit Summary Signature Page     I have received my discharge instructions, and my questions have been answered. I have discussed any challenges I see with this plan with the nurse or doctor.    ..........................................................................................................................................  Patient/Patient Representative Signature      ..........................................................................................................................................  Patient Representative Print Name and Relationship to Patient    ..................................................               ................................................  Date                                            Time    ..........................................................................................................................................  Reviewed by Signature/Title    ...................................................              ..............................................  Date                                                            Time

## 2018-01-24 LAB — COLONOSCOPY: NORMAL

## 2018-06-24 ENCOUNTER — HOSPITAL ENCOUNTER (EMERGENCY)
Facility: CLINIC | Age: 48
Discharge: HOME | End: 2018-06-25
Attending: EMERGENCY MEDICINE | Admitting: EMERGENCY MEDICINE
Payer: COMMERCIAL

## 2018-06-24 DIAGNOSIS — L50.9 HIVES: ICD-10-CM

## 2018-06-24 PROCEDURE — 96375 TX/PRO/DX INJ NEW DRUG ADDON: CPT | Performed by: EMERGENCY MEDICINE

## 2018-06-24 PROCEDURE — 99284 EMERGENCY DEPT VISIT MOD MDM: CPT | Mod: 25 | Performed by: EMERGENCY MEDICINE

## 2018-06-24 PROCEDURE — 96374 THER/PROPH/DIAG INJ IV PUSH: CPT | Performed by: EMERGENCY MEDICINE

## 2018-06-24 PROCEDURE — 25000128 H RX IP 250 OP 636: Performed by: EMERGENCY MEDICINE

## 2018-06-24 PROCEDURE — 99283 EMERGENCY DEPT VISIT LOW MDM: CPT | Mod: Z6 | Performed by: EMERGENCY MEDICINE

## 2018-06-24 RX ORDER — METHYLPREDNISOLONE SODIUM SUCCINATE 125 MG/2ML
125 INJECTION, POWDER, LYOPHILIZED, FOR SOLUTION INTRAMUSCULAR; INTRAVENOUS ONCE
Status: COMPLETED | OUTPATIENT
Start: 2018-06-24 | End: 2018-06-24

## 2018-06-24 RX ORDER — DIPHENHYDRAMINE HYDROCHLORIDE 50 MG/ML
50 INJECTION INTRAMUSCULAR; INTRAVENOUS ONCE
Status: COMPLETED | OUTPATIENT
Start: 2018-06-24 | End: 2018-06-24

## 2018-06-24 RX ADMIN — METHYLPREDNISOLONE SODIUM SUCCINATE 125 MG: 125 INJECTION, POWDER, FOR SOLUTION INTRAMUSCULAR; INTRAVENOUS at 23:25

## 2018-06-24 RX ADMIN — DIPHENHYDRAMINE HYDROCHLORIDE 50 MG: 50 INJECTION, SOLUTION INTRAMUSCULAR; INTRAVENOUS at 23:21

## 2018-06-24 NOTE — ED AVS SNAPSHOT
[facility], Emergency Department    [address]    [place] [address]    Phone:  [phone]    Fax:  [phone]                                       Penelope Capone   MRN: [id number]    Department:  [facility], Emergency Department   Date of Visit:  6/24/2018           Patient Information     Date Of Birth          1970        Your diagnoses for this visit were:     Hives        You were seen by Micah Loja MD.        Discharge Instructions       Please make an appointment to follow up with Your Primary Care Provider if not improving.    Benadryl for itching rash.    Medrol Dosepak as directed.  Fill and start this tomorrow.    Return to the emergency department for any problems.      Discharge References/Attachments     HIVES (URTICARIA) UNDERSTANDING (ENGLISH)      24 Hour Appointment Hotline       To make an appointment at any [facility] clinic, call [phone] ([phone]). If you don't have a family doctor or clinic, we will help you find one. [facility] clinics are conveniently located to serve the needs of you and your family.             Review of your medicines      START taking        Dose / Directions Last dose taken    EPINEPHrine 0.3 MG/0.3ML injection 2-pack   Commonly known as:  EPIPEN/ADRENACLICK/or ANY BX GENERIC EQUIV   Dose:  0.3 mg   Quantity:  0.6 mL        Inject 0.3 mLs (0.3 mg) into the muscle once as needed for anaphylaxis   Refills:  1        methylPREDNISolone 4 MG tablet   Commonly known as:  MEDROL DOSEPAK   Quantity:  21 tablet        Follow package instructions   Refills:  0          Our records show that you are taking the medicines listed below. If these are incorrect, please call your family doctor or clinic.        Dose / Directions Last dose taken    ciclopirox 0.77 % cream   Commonly known as:  LOPROX   Quantity:  30 g        Apply topically 2 times daily   Refills:  1        CLARITIN PO   Dose:  1 tablet        Take 1 tablet by mouth as  needed   Refills:  0                Prescriptions were sent or printed at these locations (2 Prescriptions)                   Other Prescriptions                Printed at Department/Unit printer (2 of 2)         EPINEPHrine (EPIPEN/ADRENACLICK/OR ANY BX GENERIC EQUIV) 0.3 MG/0.3ML injection 2-pack               methylPREDNISolone (MEDROL DOSEPAK) 4 MG tablet                Orders Needing Specimen Collection     None      Pending Results     No orders found for last 3 day(s).            Pending Culture Results     No orders found for last 3 day(s).            Pending Results Instructions     If you had any lab results that were not finalized at the time of your Discharge, you can call the ED Lab Result RN at [phone]. You will be contacted by this team for any positive Lab results or changes in treatment. The nurses are available 7 days a week from 10A to 6:30P.  You can leave a message 24 hours per day and they will return your call.        Thank you for choosing [facility]       Thank you for choosing [facility] for your care. Our goal is always to provide you with excellent care. Hearing back from our patients is one way we can continue to improve our services. Please take a few minutes to complete the written survey that you may receive in the mail after you visit with us. Thank you!        [employer] Information     [employer] gives you secure access to your electronic health record. If you see a primary care provider, you can also send messages to your care team and make appointments. If you have questions, please call your primary care clinic.  If you do not have a primary care provider, please call [phone] and they will assist you.        Care EveryWhere ID     This is your Care EveryWhere ID. This could be used by other organizations to access your [facility] medical records  [id number]        Equal Access to Services     MONA SEN : roger Quispe, dilcia ramirez  keturah sanches ah. So [facility] [phone].    ATENCIÓN: Si habla español, tiene a ayers disposición servicios gratuitos de asistencia lingüística. Llame al [phone].    We comply with applicable federal civil rights laws and [place] laws. We do not discriminate on the basis of race, color, national origin, age, disability, sex, sexual orientation, or gender identity.            After Visit Summary       This is your record. Keep this with you and show to your community pharmacist(s) and doctor(s) at your next visit.

## 2018-06-24 NOTE — ED AVS SNAPSHOT
[facility], [place], Emergency Department    [address]    [facility] [address]    Phone:  [phone]    Fax:  [phone]                                       Penelope Capone   MRN: [id number]    Department:  [facility], Emergency Department   Date of Visit:  6/24/2018           After Visit Summary Signature Page     I have received my discharge instructions, and my questions have been answered. I have discussed any challenges I see with this plan with the nurse or doctor.    ..........................................................................................................................................  Patient/Patient Representative Signature      ..........................................................................................................................................  Patient Representative Print Name and Relationship to Patient    ..................................................               ................................................  Date                                            Time    ..........................................................................................................................................  Reviewed by Signature/Title    ...................................................              ..............................................  Date                                                            Time

## 2018-06-25 VITALS
BODY MASS INDEX: 23.48 KG/M2 | SYSTOLIC BLOOD PRESSURE: 128 MMHG | TEMPERATURE: 98.1 F | OXYGEN SATURATION: 97 % | HEART RATE: 75 BPM | DIASTOLIC BLOOD PRESSURE: 95 MMHG | RESPIRATION RATE: 16 BRPM | WEIGHT: 160.31 LBS

## 2018-06-25 RX ORDER — METHYLPREDNISOLONE 4 MG
TABLET, DOSE PACK ORAL
Qty: 21 TABLET | Refills: 0 | Status: SHIPPED | OUTPATIENT
Start: 2018-06-25 | End: 2022-04-25

## 2018-06-25 RX ORDER — EPINEPHRINE 0.3 MG/.3ML
0.3 INJECTION SUBCUTANEOUS
Qty: 0.6 ML | Refills: 1 | Status: SHIPPED | OUTPATIENT
Start: 2018-06-25 | End: 2024-08-16

## 2018-06-25 NOTE — ED PROVIDER NOTES
[facility] [place]    6/24/18     ED 3 11:02 PM   History     Chief Complaint   Patient presents with     Hives     Onset 40 minutes ago with hives on extremities and trunk, red hands, itching, denies resp distress, took a claritin prior to arrival.     The history is provided by the patient and medical records.     Penelope Capone is a 48 year old male who presents with hives on his trunk and upper extremities.  He has a history of food allergies, particularly shellfish allergies.  Patient states that he has had 2 prior episodes of hives.  The first time occurred while he was living in [facility] and was quite some time ago.  At that time he had had some shortness of breath and was seen at hospital.  In time he had diffuse hives occurred 7 years ago in [place], and he had throat irritation with this. He came here and was treated with IV medications with improvement to symptoms.  He does not know of any specific triggers causing these episodes of hives.  He states he just spontaneously erupted with hives today. This time he has no shortness of breath or throat discomfort. He denies any unusual foods, medications or new detergents. Patient did take Claritin prior to arrival. He drove himself here. He doesn't have a ride but does live close by.     I have reviewed the Medications, Allergies, Past Medical and Surgical History, and Social History in the [employer] system.  Past Medical History:   Diagnosis Date     Dysplastic nevus     2015     Tinea cruris      Urticaria        Past Surgical History:   Procedure Laterality Date     COLONOSCOPY N/A 1/23/2018    Procedure: COLONOSCOPY;  colonoscopy;  Surgeon: Antonia Adam MD;  Location: [facility] OR     NO HISTORY OF SURGERY         Family History   Problem Relation Age of Onset     Cancer No family hx of      no skin cancer       Social History   Substance Use Topics     Smoking status: Former Smoker     Packs/day: 0.30     Years: 18.00     Types:  Cigarettes     Quit date: 1/1/2006     Smokeless tobacco: Never Used     Alcohol use Yes      Comment: 2-3 per day      Review of Systems   Skin: Positive for rash.       Physical Exam   BP: 128/62  Pulse: 75  Heart Rate: 75  Temp: 98.3  F (36.8  C)  Resp: 16  Weight: 72.7 kg (160 lb 5 oz)  SpO2: 96 %      Physical Exam   Constitutional: He is oriented to person, place, and time. Vital signs are normal. He appears well-developed and well-nourished.  Non-toxic appearance. He does not appear ill. No distress.   HENT:   Head: Normocephalic and atraumatic.   Mouth/Throat: Oropharynx is clear and moist. No oropharyngeal exudate.   Eyes: Conjunctivae and EOM are normal. Pupils are equal, round, and reactive to light. No scleral icterus.   Neck: Normal range of motion. Neck supple. No JVD present. No tracheal deviation present. No thyromegaly present.   Cardiovascular: Normal rate, regular rhythm, normal heart sounds and intact distal pulses.  Exam reveals no gallop and no friction rub.    No murmur heard.  Pulmonary/Chest: Effort normal and breath sounds normal. No respiratory distress.   Abdominal: Soft. Bowel sounds are normal. He exhibits no distension and no mass. There is no tenderness.   Musculoskeletal: Normal range of motion. He exhibits no edema or tenderness.   Lymphadenopathy:     He has no cervical adenopathy.   Neurological: He is alert and oriented to person, place, and time. He has normal strength. No cranial nerve deficit or sensory deficit.   Skin: Skin is warm and dry. Rash noted. Rash is urticarial. No erythema. No pallor.   Symmetrical urticarial eruption noted.  Lesions worse on arms bilaterally.  No mucous membrane involvement noted.   Psychiatric: He has a normal mood and affect. His behavior is normal.   Nursing note and vitals reviewed.      ED Course     ED Course     Procedures               Assessments & Plan (with Medical Decision Making)     This patient presents to the Emergency Department  with hives.  No obvious clinical signs to suggest anaphylaxis.  He has had episodes of urticaria in the past.  No obvious trigger on his noted on history.  He is hemodynamically stable and did improve with IV Solu-Medrol and Benadryl.  At this point I am comfortable discharging him home and prescribed a Medrol Dosepak and instructed him to take Benadryl for the itching rash and follow-up with his primary care provider.  He was discharged in good condition.    This part of the document was transcribed by Katheryn Moseley Medical Scribe.      I have reviewed the nursing notes.    I have reviewed the findings, diagnosis, plan and need for follow up with the patient.    Discharge Medication List as of 6/25/2018 12:41 AM      START taking these medications    Details   EPINEPHrine (EPIPEN/ADRENACLICK/OR ANY BX GENERIC EQUIV) 0.3 MG/0.3ML injection 2-pack Inject 0.3 mLs (0.3 mg) into the muscle once as needed for anaphylaxis, Disp-0.6 mL, R-1, Local Print      methylPREDNISolone (MEDROL DOSEPAK) 4 MG tablet Follow package instructions, Disp-21 tablet, R-0, Local Print             Final diagnoses:   Hives     I, Katheryn Moseley, am serving as a trained medical scribe to document services personally performed by Micah Loja MD based on the provider's statements to me on June 24, 2018.  This document has been checked and approved by the attending provider.    I, Micah Loja MD, was physically present and have reviewed and verified the accuracy of this note documented by Katheryn Moseley medical scribe.     6/24/2018   [facility], [place], EMERGENCY DEPARTMENT     Micah Loja MD  06/26/18 [id number]

## 2018-06-25 NOTE — DISCHARGE INSTRUCTIONS
Please make an appointment to follow up with Your Primary Care Provider if not improving.    Benadryl for itching rash.    Medrol Dosepak as directed.  Fill and start this tomorrow.    Return to the emergency department for any problems.

## 2018-10-30 ENCOUNTER — TRANSFERRED RECORDS (OUTPATIENT)
Dept: HEALTH INFORMATION MANAGEMENT | Facility: CLINIC | Age: 48
End: 2018-10-30

## 2020-01-07 ENCOUNTER — TRANSFERRED RECORDS (OUTPATIENT)
Dept: HEALTH INFORMATION MANAGEMENT | Facility: CLINIC | Age: 50
End: 2020-01-07

## 2020-03-01 ENCOUNTER — HEALTH MAINTENANCE LETTER (OUTPATIENT)
Age: 50
End: 2020-03-01

## 2020-12-14 ENCOUNTER — HEALTH MAINTENANCE LETTER (OUTPATIENT)
Age: 50
End: 2020-12-14

## 2021-04-07 ENCOUNTER — OFFICE VISIT (OUTPATIENT)
Dept: DENTISTRY | Facility: CLINIC | Age: 51
End: 2021-04-07

## 2021-04-07 ENCOUNTER — IMMUNIZATION (OUTPATIENT)
Dept: NURSING | Facility: CLINIC | Age: 51
End: 2021-04-07
Payer: COMMERCIAL

## 2021-04-07 DIAGNOSIS — G47.33 OBSTRUCTIVE SLEEP APNEA (ADULT) (PEDIATRIC): Primary | ICD-10-CM

## 2021-04-07 PROCEDURE — 0001A PR COVID VAC PFIZER DIL RECON 30 MCG/0.3 ML IM: CPT

## 2021-04-07 PROCEDURE — 91300 PR COVID VAC PFIZER DIL RECON 30 MCG/0.3 ML IM: CPT

## 2021-04-07 NOTE — LETTER
4/7/2021       RE: Penelope Capone  [address]  [facility] [address]     Dear Colleague,    Thank you for referring your patient, Penelope Capone, to the [facility]ENT[facility] at [facility]. Please see a copy of my visit note below.    S:   - Sleep physician Johann Pepe, Abelardo Charles  - pt in no acute distress  - pt has mild sleep apnea  - tiredness, fatigue  - no jaw pain, jaw discomfort, or jaw related headaches currently  - no functional problems (eating, chewing etc.)  - no bite problems  - no ear problems  - stress is OK, pt is [facility]   - pt has dentist, last appointment wo problem, no acute dental issues  - No family of arthritis or CA relevant for SUDHIR    O:  - Opening: not limited, no pain, passive stretch ok  - Protrusion: not limited, no pain or discomfort, pt. can stay in max protrusion without discomfort  - Teeth ok  - No M. Mass. + Temp palpation pain or discomfort  - No TMJ palpation pain  - No neck palpation pain  - No joint clicking  - V and VII are grossly intact  - lips ok, salivary glands ok, oral mucosa ok    A:  - Obstructive sleep apnea    P:  - Explained appliance therapy with models  - Side effect explained: TMD problems and bite changes and what can be done to prevent problems  - Pt understood risk and was comfortable with the risks  - Explained procedure (impressions, bite, splint delivery, and titration)  - Bite registration and impressions, insert in 4 weeks       Again, thank you for allowing me to participate in the care of your patient.      Sincerely,    Maximino Stephenson DDS

## 2021-04-17 ENCOUNTER — HEALTH MAINTENANCE LETTER (OUTPATIENT)
Age: 51
End: 2021-04-17

## 2021-04-28 ENCOUNTER — IMMUNIZATION (OUTPATIENT)
Dept: NURSING | Facility: CLINIC | Age: 51
End: 2021-04-28
Attending: RADIOLOGY
Payer: COMMERCIAL

## 2021-04-28 PROCEDURE — 91300 PR COVID VAC PFIZER DIL RECON 30 MCG/0.3 ML IM: CPT

## 2021-04-28 PROCEDURE — 0002A PR COVID VAC PFIZER DIL RECON 30 MCG/0.3 ML IM: CPT

## 2021-05-05 ENCOUNTER — OFFICE VISIT (OUTPATIENT)
Dept: DENTISTRY | Facility: CLINIC | Age: 51
End: 2021-05-05

## 2021-05-05 DIAGNOSIS — G47.33 OBSTRUCTIVE SLEEP APNEA (ADULT) (PEDIATRIC): Primary | ICD-10-CM

## 2021-05-05 NOTE — LETTER
5/5/2021       RE: Penelope Capone  [address]  [facility] [address]     Dear Colleague,    Thank you for referring your patient, Penelope Capone, to the [facility] DENTAL [facility]INIC at [facility]. Please see a copy of my visit note below.    S:   - Sleep physician Maru Jeong  - pt in no acute distress  - splint insert  - pt has severe sleep apnea  - tiredness, fatigue  - no jaw pain, jaw discomfort, or jaw related headaches currently  - no functional problems (eating, chewing etc.)  - no bite problems  - no ear problems  - stress is OK, pt is [facility]   - pt has dentist, last appointment wo problem, no acute dental issues  - No family of arthritis or CA relevant for SUDHIR    O:  - Opening: not limited, no pain, passive stretch ok  - Protrusion: not limited, no pain or discomfort, pt. can stay in max protrusion without discomfort  - Teeth ok  - No M. Mass. + Temp palpation pain or discomfort  - No TMJ palpation pain  - No neck palpation pain  - No joint clicking  - V and VII are grossly intact  - lips ok, salivary glands ok, oral mucosa ok  - Splint inserted  - Good fit, occlusion checked, pt can stay in protrusive position without discomfort  - Pt has no problems to insert appliance and take it out   - Splint care and maintenance explained  - Morning exercises explained and demonstrated  - Pt was advised to read instructions     A:  - Obstructive sleep apnea    P:  - FU in 2 weeks for adjustment  - If problems occur, pt should stop wearing the splint and should call      Again, thank you for allowing me to participate in the care of your patient.      Sincerely,    Maximino Stephenson DDS

## 2021-05-09 NOTE — PROGRESS NOTES
S:   - Sleep physician Abelardo Jones  - pt in no acute distress  - pt has mild sleep apnea  - tiredness, fatigue  - no jaw pain, jaw discomfort, or jaw related headaches currently  - no functional problems (eating, chewing etc.)  - no bite problems  - no ear problems  - stress is OK, pt is [place]   - pt has dentist, last appointment wo problem, no acute dental issues  - No family of arthritis or CA relevant for SUDHIR    O:  - Opening: not limited, no pain, passive stretch ok  - Protrusion: not limited, no pain or discomfort, pt. can stay in max protrusion without discomfort  - Teeth ok  - No M. Mass. + Temp palpation pain or discomfort  - No TMJ palpation pain  - No neck palpation pain  - No joint clicking  - V and VII are grossly intact  - lips ok, salivary glands ok, oral mucosa ok    A:  - Obstructive sleep apnea    P:  - Explained appliance therapy with models  - Side effect explained: TMD problems and bite changes and what can be done to prevent problems  - Pt understood risk and was comfortable with the risks  - Explained procedure (impressions, bite, splint delivery, and titration)  - Bite registration and impressions, insert in 4 weeks

## 2021-05-09 NOTE — PROGRESS NOTES
S:   - Sleep physician Maru Jeong  - pt in no acute distress  - splint insert  - pt has severe sleep apnea  - tiredness, fatigue  - no jaw pain, jaw discomfort, or jaw related headaches currently  - no functional problems (eating, chewing etc.)  - no bite problems  - no ear problems  - stress is OK, pt is [employer]   - pt has dentist, last appointment wo problem, no acute dental issues  - No family of arthritis or CA relevant for SUDHIR    O:  - Opening: not limited, no pain, passive stretch ok  - Protrusion: not limited, no pain or discomfort, pt. can stay in max protrusion without discomfort  - Teeth ok  - No M. Mass. + Temp palpation pain or discomfort  - No TMJ palpation pain  - No neck palpation pain  - No joint clicking  - V and VII are grossly intact  - lips ok, salivary glands ok, oral mucosa ok  - Splint inserted  - Good fit, occlusion checked, pt can stay in protrusive position without discomfort  - Pt has no problems to insert appliance and take it out   - Splint care and maintenance explained  - Morning exercises explained and demonstrated  - Pt was advised to read instructions     A:  - Obstructive sleep apnea    P:  - FU in 2 weeks for adjustment  - If problems occur, pt should stop wearing the splint and should call

## 2021-07-27 ENCOUNTER — OFFICE VISIT (OUTPATIENT)
Dept: URGENT CARE | Facility: URGENT CARE | Age: 51
End: 2021-07-27
Payer: COMMERCIAL

## 2021-07-27 VITALS
WEIGHT: 165.34 LBS | HEART RATE: 77 BPM | BODY MASS INDEX: 24.21 KG/M2 | DIASTOLIC BLOOD PRESSURE: 69 MMHG | SYSTOLIC BLOOD PRESSURE: 116 MMHG | TEMPERATURE: 98.2 F | OXYGEN SATURATION: 98 %

## 2021-07-27 DIAGNOSIS — L29.9 ITCHING: ICD-10-CM

## 2021-07-27 DIAGNOSIS — B37.2 YEAST INFECTION OF THE SKIN: Primary | ICD-10-CM

## 2021-07-27 PROCEDURE — 99203 OFFICE O/P NEW LOW 30 MIN: CPT | Performed by: FAMILY MEDICINE

## 2021-07-27 RX ORDER — CLOTRIMAZOLE AND BETAMETHASONE DIPROPIONATE 10; .64 MG/G; MG/G
CREAM TOPICAL 2 TIMES DAILY
Qty: 45 G | Refills: 0 | Status: SHIPPED | OUTPATIENT
Start: 2021-07-27 | End: 2021-08-01

## 2021-07-28 NOTE — PROGRESS NOTES
Chief Complaint   Patient presents with     Urgent Care     Pt in clinic to have eval for redness and irritation under right arm.     Derm Problem       Medical Decision Making:    ASSESMENT AND PLAN   Penelope was seen today for urgent care and derm problem.    Diagnoses and all orders for this visit:    Yeast infection of the skin  -     clotrimazole-betamethasone (LOTRISONE) 1-0.05 % external cream; Apply topically 2 times daily for 5 days    Itching      Reviewed with patient of the clinical finding advised patient to use a ointment which is both antifungal and corticosteroid component   If symptoms do not get better in couple of weeks should follow-up.        I have reviewed the nursing notes.    Differential Diagnosis:  Rash: Atopic dermatitis  Candidiasis  Cellulitis  Contact dermatitis  Dermatitis  Drug eruption  Eczema  Hives  Inflammation  Tinea dermatitis  Tinea corporis  Urticaria    Time  spent on the date of the encounter doing chart review, patient visit and documentation     see orders in Epic  Pt verbalized and agreed with the plan and is aware of the worsening symptoms for which would need to follow up .  Pt was stable during time of discharge from the clinic     SUBJECTIVE     Penelope Capone is a 51 year old male presenting with a chief complaint of    Chief Complaint   Patient presents with     Urgent Care     Pt in clinic to have eval for redness and irritation under right arm.     Derm Problem     Rash    Onset of rash was 1 month(s) ago.   Course of illness is worsening.  Severity moderate  Current and Associated symptoms: itching and red   Location of the rash: rt armpit .  Previous history of a similar rash? Yes  Recent exposure history: none known  Denies exposure to: none known  Associated symptoms include: nothing.  Treatment measures tried include: otc antifungal cream              Past Medical History:   Diagnosis Date     Dysplastic nevus     2015     Tinea cruris      Urticaria       Current Outpatient Medications   Medication Sig Dispense Refill     clotrimazole-betamethasone (LOTRISONE) 1-0.05 % external cream Apply topically 2 times daily for 5 days 45 g 0     EPINEPHrine (EPIPEN/ADRENACLICK/OR ANY BX GENERIC EQUIV) 0.3 MG/0.3ML injection 2-pack Inject 0.3 mLs (0.3 mg) into the muscle once as needed for anaphylaxis 0.6 mL 1     ciclopirox (LOPROX) 0.77 % cream Apply topically 2 times daily (Patient not taking: Reported on 7/27/2021) 30 g 1     Loratadine (CLARITIN PO) Take 1 tablet by mouth as needed (Patient not taking: Reported on 7/27/2021)       methylPREDNISolone (MEDROL DOSEPAK) 4 MG tablet Follow package instructions (Patient not taking: Reported on 7/27/2021) 21 tablet 0     Social History     Tobacco Use     Smoking status: Former Smoker     Packs/day: 0.30     Years: 18.00     Pack years: 5.40     Types: Cigarettes     Quit date: 1/1/2006     Years since quitting: 15.5     Smokeless tobacco: Never Used   Substance Use Topics     Alcohol use: Yes     Comment: 2-3 per day     Family History   Problem Relation Age of Onset     Cancer No family hx of         no skin cancer         ROS:    10 point ROS of systems including Constitutional, Eyes, Respiratory, Cardiovascular, Gastroenterology, Genitourinary,  Muscularskeletal, Psychiatric ,neurological were all negative except for pertinent positives noted in my HPI         OBJECTIVE:    /69   Pulse 77   Temp 98.2  F (36.8  C) (Oral)   Wt 75 kg (165 lb 5.5 oz)   SpO2 98%   BMI 24.21 kg/m    GENERAL APPEARANCE: healthy, alert and no distress  EYES: EOMI,  PERRL, conjunctiva clear  SKIN: Right armpit area there is a patch of redness with some moistness to the area measuring about 3 cm x 2.5 cm with no surrounding spreading redness or drainage  PSYCH: mentation appears normal  Physical Exam      (Note was completed, in part, with QPD voice-recognition software. Documentation reviewed, but some grammatical, spelling, and word  errors may remain.)  Michelle Aldana MD on 7/27/2021 at 7:34 PM

## 2021-07-28 NOTE — PATIENT INSTRUCTIONS
Patient Education     Fungal Skin Infection (Tinea)  A fungal infection occurs when too much fungus grows on or in the body. Fungus normally lives on the skin in small amounts and does not cause harm. But when too much grows on the skin, it causes an infection. This is also known as tinea. Fungal skin infections are common and not usually serious.   The infection often starts as a small red area the size of a pea. The skin may turn dry and flaky. The area may itch. As the fungus grows, it spreads out in a red [place]. Because of how it looks, fungal skin infection is often called ringworm, but it is not caused by a worm. Fungal skin infections can occur on many parts of the body. They can grow on the head, chest, arms, buttocks or legs. On the feet, fungal infection is known as  athlete s foot.  It causes itchy, sometimes painful sores between the toes and the bottom or sides of the feet. In the groin, the rash is called  jock itch.    People with weak immune systems can get a fungal infection more easily. This includes people with diabetes or HIV, or who are being treated for cancer. In these cases, the fungal infection can spread and cause severe illness. Fungal infections are also more common in people who are overweight.   In most cases, treatment is done with antifungal cream or ointment. If the infection is on your scalp, you will need to take oral medicine. To confirm the diagnosis of a fungal infection, the healthcare provider may take a small scraping of the skin to be tested in a lab.   Common fungal infections are treated with creams on the skin or oral medicine.  Home care  Follow all instructions when using antifungal cream or ointment on your skin.   General care:    If you were prescribed an oral medicine, read the patient information. Talk with your healthcare provider about the risks and side effects.    Let your skin dry completely after bathing. Carefully dry your feet and between your  Telephone Encounter by Bel Blake CMA at 07/17/18 04:37 PM     Author:  Bel Blake CMA Service:  (none) Author Type:  Certified Medical Assistant     Filed:  07/17/18 04:40 PM Encounter Date:  7/17/2018 Status:  Signed     :  Bel Blake CMA (Certified Medical Assistant)            To [facility][PR1.1M]    Last visit[PR1.1T]  3/15/18[PR1.1M]    Last Refill[PR1.1T]  Triamterene-HCTZ (MAXZIDE) 75-50 MG per tablet 45 Tab 0 5/29/2018  No   Sig: TAKE ONE-HALF TABLET BY  MOUTH DAILY[PR1.1C]          Medication has been pended in encounter for provider approval.[PR1.1T]        Revision History        User Key Date/Time User Provider Type Action    > PR1.1 07/17/18 04:40 PM Bel Blake CMA Certified Medical Assistant Sign    C - Copied, M - Manual, T - Template             toes.    Dress in loose cotton clothing.    Don t scratch the affected area. This can delay healing and may spread the infection. It can also cause a bacterial infection.    Keep your skin clean, but don t wash the skin too much. This can irritate your skin.    Keep in mind that it may take a week before the fungus starts to go away. It can take 2 to 4 weeks to fully clear. To prevent it from coming back, use the medicine until the rash is all gone.  Follow-up care  Follow up with your healthcare provider if the rash does not get better after 10 days of treatment. Also follow up if the rash spreads to other parts of your body.   When to seek medical advice  Call your healthcare provider right away if any of these occur:    Fever of 100.4 F (38 C) or higher, or as directed by your healthcare provider    Redness or swelling that gets worse    Pain that gets worse    Foul-smelling fluid leaking from the skin  Aimee last reviewed this educational content on 8/1/2019 2000-2021 [employer]. All rights reserved. This information is not intended as a substitute for professional medical care. Always follow your healthcare professional's instructions.

## 2021-10-02 ENCOUNTER — HEALTH MAINTENANCE LETTER (OUTPATIENT)
Age: 51
End: 2021-10-02

## 2022-03-10 ENCOUNTER — LAB (OUTPATIENT)
Dept: LAB | Facility: CLINIC | Age: 52
End: 2022-03-10
Attending: FAMILY MEDICINE
Payer: COMMERCIAL

## 2022-03-10 DIAGNOSIS — Z20.822 ENCOUNTER FOR LABORATORY TESTING FOR COVID-19 VIRUS: ICD-10-CM

## 2022-03-10 PROCEDURE — U0005 INFEC AGEN DETEC AMPLI PROBE: HCPCS

## 2022-03-10 PROCEDURE — U0003 INFECTIOUS AGENT DETECTION BY NUCLEIC ACID (DNA OR RNA); SEVERE ACUTE RESPIRATORY SYNDROME CORONAVIRUS 2 (SARS-COV-2) (CORONAVIRUS DISEASE [COVID-19]), AMPLIFIED PROBE TECHNIQUE, MAKING USE OF HIGH THROUGHPUT TECHNOLOGIES AS DESCRIBED BY CMS-2020-01-R: HCPCS

## 2022-03-11 LAB — SARS-COV-2 RNA RESP QL NAA+PROBE: NEGATIVE

## 2022-04-24 NOTE — PROGRESS NOTES
ASSESSMENT AND PLAN:     COUNSELING:   Reviewed preventive health counseling, as reflected in patient instructions       Regular exercise       Immunizations    Vaccinated for: TDAP and Zoster         Alcohol Use        Consider Hep C screening for all patients one time for ages 18-79 years       HIV screeninx in teen years, 1x in adult years, and at intervals if high risk    (Z00.00) Visit for well man health check  (primary encounter diagnosis)  Comment: Age appropriate screening and preventive services provided.   Plan:     (Z11.4) Screening for HIV (human immunodeficiency virus)  Plan: HIV Screening    (Z11.59) Need for hepatitis C screening test  Plan: Hepatitis C Screen Reflex to HCV RNA Quant and         Genotype    (Z13.220) Screening for hyperlipidemia  Plan: Lipid panel reflex to direct LDL Non-fasting    (L50.1) Idiopathic urticaria  Comment: Interested in repeating allergy testing/blood allergy testing because initial skin testing done previously was pan-positive. Recommended returning to [facility].     (Z30.09) Vasectomy evaluation  Comment: Sending to urology to discuss vasectomy  Plan: Adult Urology Referral  (Z78.9) Heavy alcohol use  Comment: Mildly high alcohol use. Advised reducing to max 1-2 drinks per day, <14 drinks per week. No concern otherwise.   Plan: Comprehensive metabolic panel          (Z23) Need for shingles vaccine  Plan: ZOSTER VACCINE RECOMBINANT (Shingrix)          (Z23) Need for Tdap vaccination  Plan: TDAP VACCINE (Adacel, Boostrix)  [1704769]          (G47.33) SUDHIR (obstructive sleep apnea)  Comment: Chronic, stable. Improved with oral appliance.     (R00.2) Palpitations  Comment: Likely ectopy. Normal EKG today. Otherwise asymptomatic. Monitoring only unless increase in frequency or symptoms, at which point would get Holter monitor/Zio patch and consider echo.   Plan: EKG 12-lead complete w/read - Clinics          (M65.30) Trigger finger,  acquired  Comment: History consistent with left middle finger trigger finger. NSAIDs, avoid irritating the inflamed nodule, and monitoring for another couple of weeks since it seems like it's getting better. If doesn't continue to improve, advised visit with Dr. Stevens to discuss CSI.   Plan:         Willam Warren MD  [facility]  04/24/2022, 12:15 PM      SUBJECTIVE:   Penelope is a 52 year old male who presents to clinic today to establish care and for annual wellness exam.    Penelope previously followed at our clinic but last visit was 4/26/17    # SUDHIR  - uses oral appliance  - helps with snoring and sleep    # Palpitations  - sometimes more aware of heart beat  - going on for about 4-5 years  - unsure of how often it occurs but not that often  - just a couple of beats that are off  - no pain, no dizziness  - no history of syncope  - mother has irregular heart beat    # Finger Problem  - left middle finger  - 3-4 months  - wakes up in the morning with finger stuck in flexion, has to pop open  - also happens on airplanes  - can feel a tender nodule in the finger    # Fungal Infections  - gets itchy rash in groin and armpits  - worse in the summer  - uses OTC antifungals    # Sciatica  - sees chiropractor    # Cervical radiculopathy  - left arm pain  - 2015 MRI - C6/C7 foraminal narrowing  - saw osteopath and [facility]  - better now, no longer gets shooting or numbness  - pain returns some if he isn't careful to continue stretching    # History of Positive TB test  - during immigration process in 1990s moving from [place], had a positive TB test  - took an antibiotic for 6-9 months ~2004     # Health Maintenance  - HIV Screening: do today  - Hep C Screening: do today  - BP:   BP Readings from Last 3 Encounters:   07/27/21 116/69   06/25/18 (!) 128/95   01/23/18 138/67   - Cholesterol: repeat today, not fasting  Recent Labs   Lab Test 07/14/14  1107   CHOL 171.0   HDL 60.0   LDL 88.0   TRIG 115.0      - Diabetes Screenin14 A1c 5.1%, fasting glucose 107  - Colon Cancer Screenin18 colonoscopy normal, repeat 10 years  - Exercise: not regularly    Today's PHQ-2 Score:   PHQ-2 (  [employer]) 2012   Q1: Little interest or pleasure in doing things 0 0   Q2: Feeling down, depressed or hopeless 0 0   PHQ-2 Score 0 0     Review of Systems:   Constitutional - no fevers, chills, night sweats, unintentional weight loss/gain   Eyes - no vision concerns   Ears/Nose/Throat - no hearing concerns, no dysphagia/odynophagia   Cardiovascular - as above   Pulmonary - no shortness of breath, wheezing, coughing   GI - no abdominal pain, constipation, diarrhea, nausea, vomiting    - no dysuria, polyuria, hematuria   Musculoskeletal - finger pain as above  Integument - intermittent hives, also gets non-pruritc erythematous papules with cold water exposure   Neuro - as above   Heme - no easy bruising/bleeding   Endocrine - no polyuria, weight loss/gain, dry skin, excessive sweating, hair loss   Psychiatric - no feelings of depressed mood or anhedonia in past 2 weeks   Allergic/Immunologic - no history of anaphylaxis, no history of recurrent infections    Past Medical History:   Diagnosis Date     Dysplastic nevus     2015     Tinea cruris      Urticaria      Past Surgical History:   Procedure Laterality Date     COLONOSCOPY N/A 2018    Procedure: COLONOSCOPY;  colonoscopy;  Surgeon: Antonia Adam MD;  Location: [facility] OR     NO HISTORY OF SURGERY       Family History   Problem Relation Age of Onset     Cancer No family hx of         no skin cancer     Social History     Tobacco Use     Smoking status: Former Smoker     Packs/day: 0.30     Years: 18.00     Pack years: 5.40     Types: Cigarettes     Quit date: 2006     Years since quittin.3     Smokeless tobacco: Never Used   Substance Use Topics     Alcohol use: Yes     Comment: 2-3 per day     Drug use: No     Social History     Social  History Narrative     Not on file       Current Outpatient Medications   Medication     ciclopirox (LOPROX) 0.77 % cream     EPINEPHrine (EPIPEN/ADRENACLICK/OR ANY BX GENERIC EQUIV) 0.3 MG/0.3ML injection 2-pack     Loratadine (CLARITIN PO)     methylPREDNISolone (MEDROL DOSEPAK) 4 MG tablet     No current facility-administered medications for this visit.     I have reviewed the patient's past medical, surgical, family, and social history.     OBJECTIVE:   There were no vitals taken for this visit.    Constitutional: well-appearing, appears stated age  Eyes: conjunctivae without erythema, sclera anicteric. Pupils equal, round, and reactive to light.   ENT: oropharynx clear, TM grey bilateral  Cardiac: regular rate and rhythm, normal S1/S2, no murmur/rubs/gallops  Respiratory: lungs clear to auscultation bilaterally, normal work of breathing, no wheezes/crackles  GI: abdomen soft, non-tender, non-distended  Extremities: warm and well perfused, radial pulses 2+ and equal, cap refill brisk.  Lymph: no cervical or supraclavicular lymphadenopathy  Skin: no rashes, lesions, or wounds  Psych: affect is full and appropriate, speech is fluent and non-pressured         EKG Interpretation:      Interpreted by Willam Warren MD  Symptoms at time of EKG: None   Rhythm: Normal sinus   Rate: 64 bpm  Axis: Normal  Ectopy: None  Conduction: Normal  ST Segments/ T Waves: 2mm of concave ST elevation V2 only. No T wave abnormalities.   Q Waves: None  Comparison to prior: No old EKG available    Clinical Impression: normal EKG

## 2022-04-25 ENCOUNTER — OFFICE VISIT (OUTPATIENT)
Dept: FAMILY MEDICINE | Facility: CLINIC | Age: 52
End: 2022-04-25
Payer: COMMERCIAL

## 2022-04-25 VITALS
HEART RATE: 77 BPM | HEIGHT: 68 IN | SYSTOLIC BLOOD PRESSURE: 120 MMHG | TEMPERATURE: 97.9 F | BODY MASS INDEX: 23.22 KG/M2 | WEIGHT: 153.25 LBS | DIASTOLIC BLOOD PRESSURE: 76 MMHG | RESPIRATION RATE: 12 BRPM | OXYGEN SATURATION: 98 %

## 2022-04-25 DIAGNOSIS — G47.33 OSA (OBSTRUCTIVE SLEEP APNEA): Chronic | ICD-10-CM

## 2022-04-25 DIAGNOSIS — Z11.4 SCREENING FOR HIV (HUMAN IMMUNODEFICIENCY VIRUS): ICD-10-CM

## 2022-04-25 DIAGNOSIS — M65.30 TRIGGER FINGER, ACQUIRED: ICD-10-CM

## 2022-04-25 DIAGNOSIS — Z23 NEED FOR TDAP VACCINATION: ICD-10-CM

## 2022-04-25 DIAGNOSIS — Z13.220 SCREENING FOR HYPERLIPIDEMIA: ICD-10-CM

## 2022-04-25 DIAGNOSIS — L50.1 IDIOPATHIC URTICARIA: ICD-10-CM

## 2022-04-25 DIAGNOSIS — R00.2 PALPITATIONS: ICD-10-CM

## 2022-04-25 DIAGNOSIS — Z11.59 NEED FOR HEPATITIS C SCREENING TEST: ICD-10-CM

## 2022-04-25 DIAGNOSIS — Z30.09 VASECTOMY EVALUATION: ICD-10-CM

## 2022-04-25 DIAGNOSIS — Z00.00 VISIT FOR WELL MAN HEALTH CHECK: Primary | ICD-10-CM

## 2022-04-25 DIAGNOSIS — Z23 NEED FOR SHINGLES VACCINE: ICD-10-CM

## 2022-04-25 DIAGNOSIS — F10.90 HEAVY ALCOHOL USE: ICD-10-CM

## 2022-04-25 LAB
ALBUMIN SERPL-MCNC: 4.2 G/DL (ref 3.4–5)
ALP SERPL-CCNC: 90 U/L (ref 40–150)
ALT SERPL W P-5'-P-CCNC: 32 U/L (ref 0–70)
ANION GAP SERPL CALCULATED.3IONS-SCNC: 7 MMOL/L (ref 3–14)
AST SERPL W P-5'-P-CCNC: 14 U/L (ref 0–45)
BILIRUB SERPL-MCNC: 0.5 MG/DL (ref 0.2–1.3)
BUN SERPL-MCNC: 20 MG/DL (ref 7–30)
CALCIUM SERPL-MCNC: 9.5 MG/DL (ref 8.5–10.1)
CHLORIDE BLD-SCNC: 103 MMOL/L (ref 94–109)
CHOLEST SERPL-MCNC: 262 MG/DL
CO2 SERPL-SCNC: 30 MMOL/L (ref 20–32)
CREAT SERPL-MCNC: 1.05 MG/DL (ref 0.66–1.25)
FASTING STATUS PATIENT QL REPORTED: NO
GFR SERPL CREATININE-BSD FRML MDRD: 84 ML/MIN/1.73M2
GLUCOSE BLD-MCNC: 109 MG/DL (ref 70–99)
HDLC SERPL-MCNC: 71 MG/DL
LDLC SERPL CALC-MCNC: 113 MG/DL
NONHDLC SERPL-MCNC: 191 MG/DL
POTASSIUM BLD-SCNC: 3.8 MMOL/L (ref 3.4–5.3)
PROT SERPL-MCNC: 8.4 G/DL (ref 6.8–8.8)
SODIUM SERPL-SCNC: 140 MMOL/L (ref 133–144)
TRIGL SERPL-MCNC: 391 MG/DL

## 2022-04-25 PROCEDURE — 87389 HIV-1 AG W/HIV-1&-2 AB AG IA: CPT | Performed by: FAMILY MEDICINE

## 2022-04-25 PROCEDURE — 86803 HEPATITIS C AB TEST: CPT | Performed by: FAMILY MEDICINE

## 2022-04-25 PROCEDURE — 80053 COMPREHEN METABOLIC PANEL: CPT | Performed by: FAMILY MEDICINE

## 2022-04-25 PROCEDURE — 80061 LIPID PANEL: CPT | Performed by: FAMILY MEDICINE

## 2022-04-25 NOTE — PATIENT INSTRUCTIONS
"I think you have \"Trigger Finger\"  Give the finger a few more weeks.  Try not to put a lot of pressure on the painful spot  You can take Advil or Motrin as needed for pain  If you are you not getting better, please schedule a visit with Dr. Con Stevens to talk about doing a steroid injection    To schedule a vasectomy, call urology at [phone]. I recommend Dr. John Harrell.     To schedule with an allergist, please call [facility]  [email or web address]  [phone]  "

## 2022-04-25 NOTE — NURSING NOTE
Prior to immunization administration, verified patients identity using patient s name and date of birth. Please see Immunization Activity for additional information.     Screening Questionnaire for Adult Immunization    Are you sick today?   No   Do you have allergies to medications, food, a vaccine component or latex?   No   Have you ever had a serious reaction after receiving a vaccination?   No   Do you have a long-term health problem with heart, lung, kidney, or metabolic disease (e.g., diabetes), asthma, a blood disorder, no spleen, complement component deficiency, a cochlear implant, or a spinal fluid leak?  Are you on long-term aspirin therapy?   No   Do you have cancer, leukemia, HIV/AIDS, or any other immune system problem?   No   Do you have a parent, brother, or sister with an immune system problem?   No   In the past 3 months, have you taken medications that affect  your immune system, such as prednisone, other steroids, or anticancer drugs; drugs for the treatment of rheumatoid arthritis, Crohn s disease, or psoriasis; or have you had radiation treatments?   No   Have you had a seizure, or a brain or other nervous system problem?   No   During the past year, have you received a transfusion of blood or blood    products, or been given immune (gamma) globulin or antiviral drug?   No   For women: Are you pregnant or is there a chance you could become       pregnant during the next month?   No   Have you received any vaccinations in the past 4 weeks?   No     Immunization questionnaire answers were all negative.        Per orders of Dr. Warren, injection of Tdap & Shingrix given by Adali Gomez CMA. Patient instructed to remain in clinic for 15 minutes afterwards, and to report any adverse reaction to me immediately.       Screening performed by Adali Gomez CMA on 4/25/2022 at 4:07 PM.

## 2022-04-25 NOTE — NURSING NOTE
"52 year old  Chief Complaint   Patient presents with     Physical     Finger     Left middle finger is stuck down when he wakes up       Blood pressure 120/76, pulse 77, temperature 97.9  F (36.6  C), temperature source Skin, resp. rate 12, height 1.72 m (5' 7.72\"), weight 69.5 kg (153 lb 4 oz), SpO2 98 %. Body mass index is 23.5 kg/m .  Patient Active Problem List   Diagnosis     Psoriasis     Hives     Preventative health care     CTS (carpal tunnel syndrome)     Left arm numbness       Wt Readings from Last 2 Encounters:   22 69.5 kg (153 lb 4 oz)   21 75 kg (165 lb 5.5 oz)     BP Readings from Last 3 Encounters:   22 120/76   21 116/69   18 (!) 128/95         Current Outpatient Medications   Medication     EPINEPHrine (EPIPEN/ADRENACLICK/OR ANY BX GENERIC EQUIV) 0.3 MG/0.3ML injection 2-pack     ciclopirox (LOPROX) 0.77 % cream     Loratadine (CLARITIN PO)     methylPREDNISolone (MEDROL DOSEPAK) 4 MG tablet     No current facility-administered medications for this visit.       Social History     Tobacco Use     Smoking status: Former Smoker     Packs/day: 0.30     Years: 18.00     Pack years: 5.40     Types: Cigarettes     Quit date: 2006     Years since quittin.3     Smokeless tobacco: Never Used   Vaping Use     Vaping Use: Never used   Substance Use Topics     Alcohol use: Yes     Comment: 2-3 per day     Drug use: No       Health Maintenance Due   Topic Date Due     ADVANCE CARE PLANNING  Never done     HIV SCREENING  Never done     HEPATITIS C SCREENING  Never done     PREVENTIVE CARE VISIT  2015     ZOSTER IMMUNIZATION (1 of 2) Never done     LIPID  2019     DTAP/TDAP/TD IMMUNIZATION (3 - Td or Tdap) 2020     INFLUENZA VACCINE (1) 2021       No results found for: PAP      2022 2:56 PM    "

## 2022-04-26 LAB
HCV AB SERPL QL IA: NONREACTIVE
HIV 1+2 AB+HIV1 P24 AG SERPL QL IA: NONREACTIVE

## 2022-04-27 DIAGNOSIS — R73.09 ELEVATED GLUCOSE: Primary | ICD-10-CM

## 2022-04-27 DIAGNOSIS — E78.1 HIGH TRIGLYCERIDES: ICD-10-CM

## 2022-06-08 ENCOUNTER — LAB (OUTPATIENT)
Dept: LAB | Facility: CLINIC | Age: 52
End: 2022-06-08
Payer: COMMERCIAL

## 2022-06-08 DIAGNOSIS — R73.09 ELEVATED GLUCOSE: ICD-10-CM

## 2022-06-08 DIAGNOSIS — E78.1 HIGH TRIGLYCERIDES: ICD-10-CM

## 2022-06-08 LAB
CHOLEST SERPL-MCNC: 215 MG/DL
FASTING STATUS PATIENT QL REPORTED: YES
FASTING STATUS PATIENT QL REPORTED: YES
GLUCOSE BLD-MCNC: 100 MG/DL (ref 70–99)
HDLC SERPL-MCNC: 82 MG/DL
LDLC SERPL CALC-MCNC: 101 MG/DL
NONHDLC SERPL-MCNC: 133 MG/DL
TRIGL SERPL-MCNC: 160 MG/DL

## 2022-06-08 PROCEDURE — 36415 COLL VENOUS BLD VENIPUNCTURE: CPT | Performed by: PATHOLOGY

## 2022-06-08 PROCEDURE — 82947 ASSAY GLUCOSE BLOOD QUANT: CPT | Performed by: PATHOLOGY

## 2022-06-08 PROCEDURE — 80061 LIPID PANEL: CPT | Performed by: PATHOLOGY

## 2023-01-13 ENCOUNTER — ALLIED HEALTH/NURSE VISIT (OUTPATIENT)
Dept: FAMILY MEDICINE | Facility: CLINIC | Age: 53
End: 2023-01-13
Payer: COMMERCIAL

## 2023-01-13 DIAGNOSIS — Z23 NEED FOR SHINGLES VACCINE: Primary | ICD-10-CM

## 2023-01-13 NOTE — PROGRESS NOTES
Prior to immunization administration, verified patients identity using patient s name and date of birth. Please see Immunization Activity for additional information.     Screening Questionnaire for Adult Immunization    Are you sick today?   No   Do you have allergies to medications, food, a vaccine component or latex?   No   Have you ever had a serious reaction after receiving a vaccination?   No   Do you have a long-term health problem with heart, lung, kidney, or metabolic disease (e.g., diabetes), asthma, a blood disorder, no spleen, complement component deficiency, a cochlear implant, or a spinal fluid leak?  Are you on long-term aspirin therapy?   No   Do you have cancer, leukemia, HIV/AIDS, or any other immune system problem?   No   Do you have a parent, brother, or sister with an immune system problem?   No   In the past 3 months, have you taken medications that affect  your immune system, such as prednisone, other steroids, or anticancer drugs; drugs for the treatment of rheumatoid arthritis, Crohn s disease, or psoriasis; or have you had radiation treatments?   No   Have you had a seizure, or a brain or other nervous system problem?   No   During the past year, have you received a transfusion of blood or blood    products, or been given immune (gamma) globulin or antiviral drug?   No   For women: Are you pregnant or is there a chance you could become       pregnant during the next month?   No   Have you received any vaccinations in the past 4 weeks?   No     Immunization questionnaire answers were all negative.        Per orders of Dr. Warren, injection of Shingrix given by Tabitha Scott CMA. Patient instructed to remain in clinic for 15 minutes afterwards, and to report any adverse reaction to me immediately.       Screening performed by Tabitha Scott CMA on 1/13/2023 at 3:19 PM.      Penelope Liborio comes into clinic today at the request of Dr. Warren Ordering Provider for Shingrix.      This service  provided today was under the supervising provider of the day Dr. Aguirre, who was available if needed.    Tabitha Scott, CMA

## 2023-01-14 ENCOUNTER — HEALTH MAINTENANCE LETTER (OUTPATIENT)
Age: 53
End: 2023-01-14

## 2023-02-23 ENCOUNTER — VIRTUAL VISIT (OUTPATIENT)
Dept: UROLOGY | Facility: CLINIC | Age: 53
End: 2023-02-23
Attending: UROLOGY
Payer: COMMERCIAL

## 2023-02-23 DIAGNOSIS — Z30.09 VASECTOMY EVALUATION: Primary | ICD-10-CM

## 2023-02-23 PROCEDURE — 99243 OFF/OP CNSLTJ NEW/EST LOW 30: CPT | Mod: VID | Performed by: UROLOGY

## 2023-02-23 NOTE — PROGRESS NOTES
Penelope is a 53 year old who is being evaluated via a billable video visit.      How would you like to obtain your AVS? MyChart  If the video visit is dropped, the invitation should be resent by: Text to cell phone: [phone]  Will anyone else be joining your video visit? No              Subjective   Penelope is a 53 year old, presenting for the following health issues:  Video Visit      HPI     Patient was requested to be seen by Dr. Warren for vasectomy.  He has 2 children.    Review of Systems   Constitutional, HEENT, cardiovascular, pulmonary, gi and gu systems are negative, except as otherwise noted.      Objective           Vitals:  No vitals were obtained today due to virtual visit.    Physical Exam   GENERAL: Healthy, alert and no distress  EYES: Eyes grossly normal to inspection.  No discharge or erythema, or obvious scleral/conjunctival abnormalities.  RESP: No audible wheeze, cough, or visible cyanosis.  No visible retractions or increased work of breathing.    SKIN: Visible skin clear. No significant rash, abnormal pigmentation or lesions.  NEURO: Cranial nerves grossly intact.  Mentation and speech appropriate for age.  PSYCH: Mentation appears normal, affect normal/bright, judgement and insight intact, normal speech and appearance well-groomed.        Discussed    That vasectomy is permanent method of birth control.    That vasectomy can fail due to recanalization of the vas even many months/years later.    That he needs 2 negative sperm checks to be considered sterile    That there are other methods that are not permanent and also that the sperm can be frozen for later use.    How the technique is performed, risks of infection, bleeding, damage to the testes vessels and testes atrophy    Long term complication such as chronic and difficult to treat testes pain and questionable increase incidence of prostate cancer    That the procedure can be done at the clinic or hospital OR        Plan:    Stop  Aspirin  Will schedule Vasectomy in the future          Video-Visit Details    Type of service:  Video Visit     Originating Location (pt. Location): Home    Distant Location (provider location):  Off-site  Platform used for Video Visit: [email or web address]

## 2023-02-27 ENCOUNTER — TELEPHONE (OUTPATIENT)
Dept: UROLOGY | Facility: CLINIC | Age: 53
End: 2023-02-27
Payer: COMMERCIAL

## 2023-02-27 NOTE — TELEPHONE ENCOUNTER
[facility] Call Center    Phone Message    May a detailed message be left on voicemail: yes     Reason for Call: Other: pt calling and wanting to set up vasectomy, writer found no appt available, please advise with pt     Action Taken: Message routed to:  Adult Clinics: Urology p [phone]    Travel Screening: Not Applicable

## 2023-03-07 ENCOUNTER — OFFICE VISIT (OUTPATIENT)
Dept: FAMILY MEDICINE | Facility: CLINIC | Age: 53
End: 2023-03-07
Payer: COMMERCIAL

## 2023-03-07 VITALS
DIASTOLIC BLOOD PRESSURE: 76 MMHG | HEART RATE: 64 BPM | RESPIRATION RATE: 15 BRPM | TEMPERATURE: 98.5 F | OXYGEN SATURATION: 99 % | SYSTOLIC BLOOD PRESSURE: 120 MMHG | WEIGHT: 153 LBS | BODY MASS INDEX: 23.46 KG/M2

## 2023-03-07 DIAGNOSIS — R04.2 BLOOD-TINGED SPUTUM: ICD-10-CM

## 2023-03-07 DIAGNOSIS — M54.50 LEFT-SIDED LOW BACK PAIN WITHOUT SCIATICA, UNSPECIFIED CHRONICITY: Primary | ICD-10-CM

## 2023-03-07 NOTE — NURSING NOTE
53 year old  Chief Complaint   Patient presents with     Nose Problem     Notes snoring and also some recent bloody mucus     Derm Problem     Check mole R neck      Musculoskeletal Problem     Back pain after fall 2-3 years ago       Blood pressure 120/76, pulse 64, temperature 98.5  F (36.9  C), temperature source Skin, resp. rate 15, weight 69.4 kg (153 lb), SpO2 99 %. Body mass index is 23.46 kg/m .  Patient Active Problem List   Diagnosis     SUDHIR (obstructive sleep apnea)       Wt Readings from Last 2 Encounters:   23 69.4 kg (153 lb)   22 69.5 kg (153 lb 4 oz)     BP Readings from Last 3 Encounters:   23 120/76   22 120/76   21 116/69         Current Outpatient Medications   Medication     EPINEPHrine (EPIPEN/ADRENACLICK/OR ANY BX GENERIC EQUIV) 0.3 MG/0.3ML injection 2-pack     Loratadine (CLARITIN PO)     No current facility-administered medications for this visit.       Social History     Tobacco Use     Smoking status: Former     Packs/day: 0.30     Years: 18.00     Pack years: 5.40     Types: Cigarettes     Quit date: 2005     Years since quittin.1     Smokeless tobacco: Never   Vaping Use     Vaping Use: Never used   Substance Use Topics     Alcohol use: Yes     Comment: 2-3 per day     Drug use: No       Health Maintenance Due   Topic Date Due     ADVANCE CARE PLANNING  Never done     HEPATITIS B IMMUNIZATION (1 of 3 - 3-dose series) Never done     INFLUENZA VACCINE (1) 2022       No results found for: PAP      2023 10:19 AM

## 2023-03-07 NOTE — PROGRESS NOTES
Assessment & Plan   Problem List Items Addressed This Visit    None  Visit Diagnoses     Left-sided low back pain without sciatica, unspecified chronicity    -  Primary    Blood-tinged sputum        Relevant Orders    Adult ENT  Referral        Unclear cause for blood in sputum. Low suspicion for infection. Would have thought likely epistaxis association but that does not appear to be the case. Low suspicion for malignancy given history but will refer to ENT for consideration of possible scoping.    Back pain likely muscular strain. Exam reassuring against nerve impingement/sciatica. 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 I suggested patient provide contact information for provider so we can fax over a referral for PT/OMT     38 minutes spent on the date of the encounter doing chart review, history and exam, documentation and further activities as noted.    Eugene Aguirre MD  [facility]    Alex Negrete is a 53 year old presenting for the following health issues:  Nose Problem (Notes snoring and also some recent bloody mucus), Derm Problem (Check mole R neck ), and Musculoskeletal Problem (Back pain after fall 2-3 years ago)      HPI   Bloody mucus  - first thing in the morning  - thinks it is coming from his throat as opposed to nasal mucosa  - blows his nose and there is no blood  - no cough, sore throat, difficulty swallowing, no vocal changes  - does report a distant history of smoking  - no real pain associated  - denies fever, chills, night sweats, weight loss  - no suspected exposure to TB    Back Pain  - Injury 2-3 years ago ice skating  - landed on [facility]  - didn't really do any sort of imaging or PT at the time, eventually things improved  - previous history of sciatic pain has since resolved  - more recently, he reports LEFT sided pain above iliac crest without sciatic radiculopathy.      Review of Systems    Constitutional, HEENT, cardiovascular, pulmonary, gi and gu systems are negative, except as otherwise noted.      Objective    /76 (BP Location: Left arm, Patient Position: Sitting, Cuff Size: Adult Regular)   Pulse 64   Temp 98.5  F (36.9  C) (Skin)   Resp 15   Wt 69.4 kg (153 lb)   SpO2 99%   BMI 23.46 kg/m    Body mass index is 23.46 kg/m .  Physical Exam   GENERAL: healthy, alert and no distress  HENT: ear canals and TM's normal, nose and mouth without ulcers or lesions  NECK: no adenopathy, no asymmetry, masses, or scars and thyroid normal to palpation  RESP: lungs clear to auscultation - no rales, rhonchi or wheezes  CV: regular rate and rhythm, normal S1 S2, no S3 or S4, no murmur, click or rub, no peripheral edema and peripheral pulses strong  ABDOMEN: soft, nontender, no hepatosplenomegaly, no masses and bowel sounds normal  MS: no gross musculoskeletal defects noted, no edema  Comprehensive back pain exam:  No tenderness, Range of motion not limited by pain, Lower extremity strength functional and equal on both sides, Lower extremity reflexes within normal limits bilaterally, Lower extremity sensation normal and equal on both sides and Straight leg raise negative bilaterally

## 2023-03-13 NOTE — TELEPHONE ENCOUNTER
FUTURE VISIT INFORMATION      FUTURE VISIT INFORMATION:    Date: 6/29/23    Time: 7:30    Location: [facility]  REFERRAL INFORMATION:    Referring provider:  Eugene Aguirre MD,    Referring providers clinic:  [facility]    Reason for visit/diagnosis  appt per pt/ref, Blood-tinged sputum [R04.2], ref prov: Eugene Aguirre MD, recs/ref in epic, confirmed [facility] location    RECORDS REQUESTED FROM:       Clinic name Comments Records Status Imaging Status   [facility] 3/7/23- note with Eugene Aguirre MD, epic

## 2023-04-21 ENCOUNTER — TELEPHONE (OUTPATIENT)
Dept: UROLOGY | Facility: CLINIC | Age: 53
End: 2023-04-21
Payer: COMMERCIAL

## 2023-04-21 NOTE — TELEPHONE ENCOUNTER
[facility] Call Center    Phone Message    May a detailed message be left on voicemail: yes     Reason for Call: Other: .  Pt calling regarding upcoming vasectomy procedure on Monday with [facility]. Pt is wanting to know would he be okay to return to work the following day. Please call pt    Action Taken: Message routed to:  Other: Uro    Travel Screening: Not Applicable

## 2023-04-21 NOTE — TELEPHONE ENCOUNTER
Per appointment desk, ok to leave detailed message.  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  Writer left our direct office number if patient wants more details or to discuss further.   Dora Seymour, CMA

## 2023-04-24 ENCOUNTER — OFFICE VISIT (OUTPATIENT)
Dept: UROLOGY | Facility: CLINIC | Age: 53
End: 2023-04-24
Payer: COMMERCIAL

## 2023-04-24 DIAGNOSIS — Z30.2 ENCOUNTER FOR STERILIZATION: Primary | ICD-10-CM

## 2023-04-24 PROCEDURE — 55250 REMOVAL OF SPERM DUCT(S): CPT | Performed by: UROLOGY

## 2023-04-24 PROCEDURE — 88302 TISSUE EXAM BY PATHOLOGIST: CPT | Performed by: PATHOLOGY

## 2023-04-24 PROCEDURE — 99000 SPECIMEN HANDLING OFFICE-LAB: CPT | Performed by: UROLOGY

## 2023-04-26 LAB
PATH REPORT.COMMENTS IMP SPEC: NORMAL
PATH REPORT.COMMENTS IMP SPEC: NORMAL
PATH REPORT.FINAL DX SPEC: NORMAL
PATH REPORT.GROSS SPEC: NORMAL
PATH REPORT.MICROSCOPIC SPEC OTHER STN: NORMAL
PATH REPORT.RELEVANT HX SPEC: NORMAL
PHOTO IMAGE: NORMAL

## 2023-05-24 ENCOUNTER — TELEPHONE (OUTPATIENT)
Dept: UROLOGY | Facility: CLINIC | Age: 53
End: 2023-05-24
Payer: COMMERCIAL

## 2023-05-24 NOTE — TELEPHONE ENCOUNTER
Patient called into clinic and left a voicemail for an explanation for the pathology lab test as he got a bill regarding this.    10:43 AM  Writer called back and left detailed message on patient's cell phone regarding the indication for the pathology lab test.    Nahomi BARKLEY RN Urology 5/24/2023 10:44 AM

## 2023-06-02 ENCOUNTER — HEALTH MAINTENANCE LETTER (OUTPATIENT)
Age: 53
End: 2023-06-02

## 2023-06-27 ENCOUNTER — NURSE TRIAGE (OUTPATIENT)
Dept: NURSING | Facility: CLINIC | Age: 53
End: 2023-06-27
Payer: COMMERCIAL

## 2023-06-27 ENCOUNTER — MYC MEDICAL ADVICE (OUTPATIENT)
Dept: FAMILY MEDICINE | Facility: CLINIC | Age: 53
End: 2023-06-27

## 2023-06-27 DIAGNOSIS — E78.1 HIGH TRIGLYCERIDES: Primary | ICD-10-CM

## 2023-06-27 DIAGNOSIS — R73.01 ELEVATED FASTING GLUCOSE: ICD-10-CM

## 2023-06-27 NOTE — TELEPHONE ENCOUNTER
Penelope is trying to schedule a lab only appt for a fasting lipid panel, as [facility].    Chart Review does not show that a Lab order has been placed by the provider.    Advised pt to contact PCP office to request order be placed for requested Lab - Fasting Lipid Panel    Priscilla Rob RN  [facility]

## 2023-06-27 NOTE — TELEPHONE ENCOUNTER
Advised pt (by my chart message) to schedule separate lab-only appointment since his annual wellness appointment is at 4pm on 7/10/2023 and we would like to obtain a fasting lipid panel.    LISETH Leonard, RN  06/27/23, 3:28 PM

## 2023-06-28 NOTE — TELEPHONE ENCOUNTER
Penelope is trying to schedule a lab only appt for a fasting lipid panel, as instructed via [employer] msg.    Chart Review does not show that a Lab order has been placed by the provider.    Advised pt to contact PCP office to request order be placed for requested Lab - Fasting Lipid Panel    Priscilla Rob RN  [facility]      Reason for Disposition    [1] Follow-up call from patient regarding patient's clinical status AND [2] information NON-URGENT    Protocols used: PCP CALL - NO TRIAGE-A-

## 2023-06-28 NOTE — TELEPHONE ENCOUNTER
[employer] message sent to patient that lab work needs to wait until his appointment on 7/10/23.  Kind regards,  AARTI WolfeN, RN, CCM     Patient

## 2023-06-29 ENCOUNTER — OFFICE VISIT (OUTPATIENT)
Dept: OTOLARYNGOLOGY | Facility: CLINIC | Age: 53
End: 2023-06-29
Attending: FAMILY MEDICINE
Payer: COMMERCIAL

## 2023-06-29 ENCOUNTER — PRE VISIT (OUTPATIENT)
Dept: OTOLARYNGOLOGY | Facility: CLINIC | Age: 53
End: 2023-06-29

## 2023-06-29 VITALS
OXYGEN SATURATION: 97 % | BODY MASS INDEX: 23.61 KG/M2 | DIASTOLIC BLOOD PRESSURE: 91 MMHG | WEIGHT: 154 LBS | SYSTOLIC BLOOD PRESSURE: 134 MMHG | HEART RATE: 61 BPM | TEMPERATURE: 97.5 F

## 2023-06-29 DIAGNOSIS — K21.00 GASTROESOPHAGEAL REFLUX DISEASE WITH ESOPHAGITIS WITHOUT HEMORRHAGE: Primary | ICD-10-CM

## 2023-06-29 DIAGNOSIS — R04.2 BLOOD-TINGED SPUTUM: ICD-10-CM

## 2023-06-29 PROCEDURE — 31575 DIAGNOSTIC LARYNGOSCOPY: CPT | Performed by: OTOLARYNGOLOGY

## 2023-06-29 PROCEDURE — 99243 OFF/OP CNSLTJ NEW/EST LOW 30: CPT | Mod: 25 | Performed by: OTOLARYNGOLOGY

## 2023-06-29 ASSESSMENT — PAIN SCALES - GENERAL: PAINLEVEL: NO PAIN (0)

## 2023-06-29 NOTE — PROGRESS NOTES
The patient presents with a history of blood in his sputum recently. He has a history of obstructive sleep apnea and he uses a dental appliance. He has thick post-nasal drainage at times. The patient denies sinusitis, rhinitis, facial pain, nasal obstruction or purulent nasal discharge. The patient denies chronic or recurrent tonsillitis, chronic or recurrent pharyngitis. The patient denies otalgia, otorrhea, eustachian tube dysfunction, ear infections, dizziness or tinnitus.       This patient is seen in consultation at the request of Dr. Eugene Aguirre.     Past Medical History:    Past Medical History:   Diagnosis Date     Dysplastic nevus     2015     Latent tuberculosis     Tested for immigration 1994. Received 6-9 months of treatment with [facility] ~2004     SUDHIR (obstructive sleep apnea)      Sensory hearing loss, unilateral     Left     Tinea cruris      Urticaria        Past Surgical History:    Past Surgical History:   Procedure Laterality Date     COLONOSCOPY N/A 01/23/2018    Procedure: COLONOSCOPY;  colonoscopy;  Surgeon: Antonia Adam MD;  Location:  OR       Medications:      Current Outpatient Medications:      Loratadine (CLARITIN PO), Take 1 tablet by mouth as needed, Disp: , Rfl:      EPINEPHrine (EPIPEN/ADRENACLICK/OR ANY BX GENERIC EQUIV) 0.3 MG/0.3ML injection 2-pack, Inject 0.3 mLs (0.3 mg) into the muscle once as needed for anaphylaxis (Patient not taking: Reported on 6/29/2023), Disp: 0.6 mL, Rfl: 1    Allergies:    Shellfish allergy and Adhesive tape    Physical Examination:    The patient is a well developed, well nourished male in no apparent distress.  He is normocepahlic, atraumatic with pupils equally round and reactive to light.    Oral Cavity Examination: Normal Mucosa with no masses or lesions, examination performed with a flexible fiberoptic scope.   Nasal Examination: Normal Mucosa with no masses or lesions, examination performed with a flexible fiberoptic scope.   Ear  Examination: Ear canals clear, tympanic membranes and middle ear spaces normal  Neurological Examination: Facial nerve function intact and symmetric  Integumentary Examination: No lesions on the skin of the head or neck  Neck Examination: No masses or lesions, no lymphadenopathy  Endocrine Examination: Normal thyroid examination  Flexible Fiberoptic Laryngoscopy:  Normal nasopharynx, pyriform sinuses, epiglottis, valleculae, false vocal cords, true vocal cords, and larynx.  Normal motion of the vocal cords with no lesions, masses, nodules, or polyps bilaterally.  Material on the base of tongue consistent with gastroesophageal reflux.     Assessment and Plan:    The patient presents with a history of blood in the sputum at times. His examination today reveals evidence of gastroesophageal reflux. We have discussed dietary and activity issues associated with gastroesophageal reflux. We have discussed medical options of therapy including Apple Cider Vinegar, Papaya Enzymes, Maalox, H2 blockers and PPI medications. He will try the first three options of therapy. He will be seen again in our clinic based upon his progress.     CC: Dr. Eugene Aguirre

## 2023-06-29 NOTE — PATIENT INSTRUCTIONS
1. You were seen in the ENT Clinic today by Dr. Saba.  If you have any questions or concerns after your appointment, please call   - Option 1: ENT Clinic: [phone]   - Option 2: María Elena (Dr. Saba's Nurse): [phone]       Ly (Dr. Saba's Nurse): [phone]    2.   follow up with general ENT provider for reflux/GERD as needed    How to Contact Us:  Send a [employer] message to your provider. Our team will respond to you via [employer] Occasionally, we will need to call you to get further information.  For urgent matters (Monday-Friday), call the ENT Clinic: [phone] and speak with a call center team member - they will route your call appropriately.   If you'd like to speak directly with a nurse, please find our contact information below. We do our best to check voicemail frequently throughout the day, and will work to call you back within 1-2 days. For urgent matters, please use the general clinic phone numbers listed above.    The patient presents with a history of blood in the sputum at times. His examination today reveals evidence of gastroesophageal reflux. We have discussed dietary and activity issues associated with gastroesophageal reflux. We have discussed medical options of therapy including Apple Cider Vinegar, Papaya Enzymes, Maalox, H2 blockers and PPI medications. He will try the first three options of therapy. He will be seen again in our clinic based upon his progress.     María Elena TANG LPN  [facility] - Otolaryngology

## 2023-06-29 NOTE — NURSING NOTE
Chief Complaint   Patient presents with     Consult     hemoptysis     Blood pressure (!) 134/91, pulse 61, temperature 97.5  F (36.4  C), weight 69.9 kg (154 lb), SpO2 97 %.    Asaf Gagnon LPN

## 2023-06-29 NOTE — LETTER
6/29/2023       RE: Penelope Capone  [address]  [facility] [address]     Dear Colleague,    Thank you for referring your patient, Penelope Capone, to the [facility] [place] [facility]. Please see a copy of my visit note below.    The patient presents with a history of blood in his sputum recently. He has a history of obstructive sleep apnea and he uses a dental appliance. He has thick post-nasal drainage at times. The patient denies sinusitis, rhinitis, facial pain, nasal obstruction or purulent nasal discharge. The patient denies chronic or recurrent tonsillitis, chronic or recurrent pharyngitis. The patient denies otalgia, otorrhea, eustachian tube dysfunction, ear infections, dizziness or tinnitus.       This patient is seen in consultation at the request of Dr. Eugene Aguirre.     Past Medical History:    Past Medical History:   Diagnosis Date    Dysplastic nevus     2015    Latent tuberculosis     Tested for immigration 1994. Received 6-9 months of treatment with [facility] ~2004    SUDHIR (obstructive sleep apnea)     Sensory hearing loss, unilateral     Left    Tinea cruris     Urticaria        Past Surgical History:    Past Surgical History:   Procedure Laterality Date    COLONOSCOPY N/A 01/23/2018    Procedure: COLONOSCOPY;  colonoscopy;  Surgeon: Antonia Adam MD;  Location:  OR       Medications:      Current Outpatient Medications:     Loratadine (CLARITIN PO), Take 1 tablet by mouth as needed, Disp: , Rfl:     EPINEPHrine (EPIPEN/ADRENACLICK/OR ANY BX GENERIC EQUIV) 0.3 MG/0.3ML injection 2-pack, Inject 0.3 mLs (0.3 mg) into the muscle once as needed for anaphylaxis (Patient not taking: Reported on 6/29/2023), Disp: 0.6 mL, Rfl: 1    Allergies:    Shellfish allergy and Adhesive tape    Physical Examination:    The patient is a well developed, well nourished male in no apparent distress.  He is normocepahlic,  atraumatic with pupils equally round and reactive to light.    Oral Cavity Examination: Normal Mucosa with no masses or lesions, examination performed with a flexible fiberoptic scope.   Nasal Examination: Normal Mucosa with no masses or lesions, examination performed with a flexible fiberoptic scope.   Ear Examination: Ear canals clear, tympanic membranes and middle ear spaces normal  Neurological Examination: Facial nerve function intact and symmetric  Integumentary Examination: No lesions on the skin of the head or neck  Neck Examination: No masses or lesions, no lymphadenopathy  Endocrine Examination: Normal thyroid examination  Flexible Fiberoptic Laryngoscopy:  Normal nasopharynx, pyriform sinuses, epiglottis, valleculae, false vocal cords, true vocal cords, and larynx.  Normal motion of the vocal cords with no lesions, masses, nodules, or polyps bilaterally.  Material on the base of tongue consistent with gastroesophageal reflux.     Assessment and Plan:    The patient presents with a history of blood in the sputum at times. His examination today reveals evidence of gastroesophageal reflux. We have discussed dietary and activity issues associated with gastroesophageal reflux. We have discussed medical options of therapy including Apple Cider Vinegar, Papaya Enzymes, Maalox, H2 blockers and PPI medications. He will try the first three options of therapy. He will be seen again in our clinic based upon his progress.     CC: Dr. Eugene Aguirre      Again, thank you for allowing me to participate in the care of your patient.      Sincerely,    Tacho Saba MD

## 2023-07-08 NOTE — PROGRESS NOTES
SUBJECTIVE:   CC: Penelope is an 53 year old who presents for preventative health visit.        No data to display              Healthy Habits:     Getting at least 3 servings of Calcium per day:  Yes    Bi-annual eye exam:  Yes    Dental care twice a year:  NO    Sleep apnea or symptoms of sleep apnea:  Excessive snoring    Diet:  Gluten-free/reduced and Breakfast skipped    Frequency of exercise:  None    Taking medications regularly:  Yes    Medication side effects:  Not applicable    Additional concerns today:  Yes      # Heart Concern  - sometimes more aware of heart beat  - going on for about 4-5 years  - unsure of how often it occurs but not that often  - just a couple of beats that are off  - no pain, no dizziness  - no history of syncope  - mother has paroxysmal atrial fibrillation    - was in [place] 5/12 to 6/23/23  - happened about 3 times over that period, which is more frequent than typical  - a couple of the times was in the middle of the night - unsure if it woke him up or if he was awake and noticed  - takes some deep breaths and will pass    - only regular activity is walking, was averaging 20,000 steps while in [place]  - not particularly brisk walking  - never gets palpitations with walking    - doesn't believe that it has happened in the past 3 weeks    # SUDHIR  - uses oral appliance  - helps with snoring and sleep  - still snores but not as much      # Fungal Infections  - gets itchy rash in groin and armpits  - worse in the summer  - uses OTC antifungals  - tried what he thinks was an OTC steroid in [place] which helped     # Chronic Left Low Back Pain  - sees chiropractor,  Raissa Lama DC, [facility] at [facility] in [place]  - has had 2 appointments so far, having adjustments and home exercises  - currently no sciatica (has had in the past)  - particularly bad in the morning    # Cervical radiculopathy  - left arm pain  - 2015 MRI - C6/C7 foraminal narrowing  - saw osteopath and  [facility]  - better now, no longer gets shooting or numbness  - pain returns some if he isn't careful to continue stretching    # Blood in sputum  - saw ENT for this 23  - thought to be reflux related, recommended apple cider vinegar, papaya enzymes, maalox  - has tried papaya enzyme which seems like it is helping with mucous in the morning    # HM  - last colonoscopy 16, no polyps repeat 10 years, no family history of colon cancer    Social History     Tobacco Use     Smoking status: Former     Packs/day: 0.30     Years: 18.00     Pack years: 5.40     Types: Cigarettes     Quit date: 2005     Years since quittin.5     Smokeless tobacco: Never   Substance Use Topics     Alcohol use: Yes     Comment: 2-3 per day            No data to display              Last PSA: No results found for: PSA    Reviewed orders with patient. Reviewed health maintenance and updated orders accordingly - Yes    Reviewed and updated as needed this visit by clinical staff                  Reviewed and updated as needed this visit by Provider                   Review of Systems   Constitutional: Negative for chills and fever.   HENT: Negative for congestion, ear pain, hearing loss and sore throat.    Eyes: Negative for pain and visual disturbance.   Respiratory: Negative for cough and shortness of breath.    Cardiovascular: Negative for chest pain, palpitations and peripheral edema.   Gastrointestinal: Negative for abdominal pain, constipation, diarrhea, heartburn, hematochezia and nausea.   Genitourinary: Negative for dysuria, frequency, genital sores, hematuria, impotence, penile discharge and urgency.   Musculoskeletal: Negative for arthralgias, joint swelling and myalgias.   Skin: Negative for rash.   Neurological: Negative for dizziness, weakness, headaches and paresthesias.   Psychiatric/Behavioral: Negative for mood changes. The patient is not nervous/anxious.        OBJECTIVE:   There were no vitals taken for this  visit.    Physical Exam  GENERAL: healthy, alert and no distress  EYES: Eyes grossly normal to inspection, PERRL and conjunctivae and sclerae normal  HENT: ear canals and TM's normal, nose and mouth without ulcers or lesions  NECK: no adenopathy, no asymmetry, masses, or scars and thyroid normal to palpation  RESP: lungs clear to auscultation - no rales, rhonchi or wheezes  CV: regular rate and rhythm, normal S1 S2, no S3 or S4, no murmur, click or rub, no peripheral edema and peripheral pulses strong  ABDOMEN: soft, nontender, no hepatosplenomegaly, no masses and bowel sounds normal  MS: no gross musculoskeletal defects noted, no edema  SKIN: no suspicious lesions or rashes  NEURO: Normal strength and tone, mentation intact and speech normal  PSYCH: mentation appears normal, affect normal/bright    Diagnostic Test Results:  Labs reviewed in Epic    ASSESSMENT/PLAN:     (Z00.00) Annual physical exam  (primary encounter diagnosis)  Comment: Age appropriate screening and preventive services provided.   Plan: Discussed risks and benefits of prostate cancer screening with PSA including overdiagnosis risks and risk of missing a prostate cancer diagnosis. At this time, Penelope would prefer not to check a PSA. Will address with him annually.     (G47.33) SUDHIR (obstructive sleep apnea)  Comment: Chronic, stable with oral appliance.   Plan:     (R00.2) Palpitations  Comment: Chronic, stable.   Plan: Had a temporary period last month of more frequent palpitations (weekly), but has since then returned to his infrequent symptoms. Still not associated with any other symptoms or exertion. Mother has pAfib and Penelope is at increased risk due to his SUDHIR. We discussed heart monitoring to confirm the diagnosis but also the logistic difficulty given how infrequent his symptoms are. I don't feel that he needs definitive confirmation at this time but told him to message me if he has another period of more frequent episodes and I will  order him a Zio patch.     (E78.1) High triglycerides  Comment: Chronic, not at goal.   Plan: Update fasting lipids tomorrow.     (R73.01) Elevated fasting glucose  Comment: Chronic, not at goal.   Plan: Update fasting glucose tomorrow.       COUNSELING:   Reviewed preventive health counseling, as reflected in patient instructions        He reports that he quit smoking about 18 years ago. His smoking use included cigarettes. He has a 5.40 pack-year smoking history. He has never used smokeless tobacco.      MD ADDIE Donald [facility]

## 2023-07-10 ENCOUNTER — OFFICE VISIT (OUTPATIENT)
Dept: FAMILY MEDICINE | Facility: CLINIC | Age: 53
End: 2023-07-10
Payer: COMMERCIAL

## 2023-07-10 VITALS
HEIGHT: 68 IN | BODY MASS INDEX: 23.05 KG/M2 | SYSTOLIC BLOOD PRESSURE: 118 MMHG | HEART RATE: 63 BPM | TEMPERATURE: 97.6 F | DIASTOLIC BLOOD PRESSURE: 81 MMHG | WEIGHT: 152.12 LBS | OXYGEN SATURATION: 97 %

## 2023-07-10 DIAGNOSIS — E78.1 HIGH TRIGLYCERIDES: ICD-10-CM

## 2023-07-10 DIAGNOSIS — G47.33 OSA (OBSTRUCTIVE SLEEP APNEA): Chronic | ICD-10-CM

## 2023-07-10 DIAGNOSIS — Z00.00 ANNUAL PHYSICAL EXAM: Primary | ICD-10-CM

## 2023-07-10 DIAGNOSIS — R73.01 ELEVATED FASTING GLUCOSE: ICD-10-CM

## 2023-07-10 DIAGNOSIS — R00.2 PALPITATIONS: ICD-10-CM

## 2023-07-10 ASSESSMENT — ENCOUNTER SYMPTOMS
PALPITATIONS: 0
NERVOUS/ANXIOUS: 0
SHORTNESS OF BREATH: 0
COUGH: 0
HEMATOCHEZIA: 0
FEVER: 0
ARTHRALGIAS: 0
WEAKNESS: 0
PARESTHESIAS: 0
MYALGIAS: 0
JOINT SWELLING: 0
SORE THROAT: 0
HEADACHES: 0
CONSTIPATION: 0
HEMATURIA: 0
EYE PAIN: 0
ABDOMINAL PAIN: 0
DYSURIA: 0
NAUSEA: 0
HEARTBURN: 0
CHILLS: 0
DIARRHEA: 0
DIZZINESS: 0
FREQUENCY: 0

## 2023-07-11 ENCOUNTER — OFFICE VISIT (OUTPATIENT)
Dept: DERMATOLOGY | Facility: CLINIC | Age: 53
End: 2023-07-11
Payer: COMMERCIAL

## 2023-07-11 ENCOUNTER — LAB (OUTPATIENT)
Dept: LAB | Facility: CLINIC | Age: 53
End: 2023-07-11
Payer: COMMERCIAL

## 2023-07-11 DIAGNOSIS — D22.9 MULTIPLE BENIGN NEVI: ICD-10-CM

## 2023-07-11 DIAGNOSIS — L82.1 SEBORRHEIC KERATOSES: Primary | ICD-10-CM

## 2023-07-11 DIAGNOSIS — L81.4 LENTIGINES: ICD-10-CM

## 2023-07-11 DIAGNOSIS — E78.1 HIGH TRIGLYCERIDES: ICD-10-CM

## 2023-07-11 DIAGNOSIS — R73.01 ELEVATED FASTING GLUCOSE: ICD-10-CM

## 2023-07-11 LAB
CHOLEST SERPL-MCNC: 206 MG/DL
FASTING STATUS PATIENT QL REPORTED: YES
GLUCOSE SERPL-MCNC: 98 MG/DL (ref 70–99)
HBA1C MFR BLD: 5.5 % (ref 0–5.6)
HDLC SERPL-MCNC: 74 MG/DL
LDLC SERPL CALC-MCNC: 106 MG/DL
NONHDLC SERPL-MCNC: 132 MG/DL
TRIGL SERPL-MCNC: 128 MG/DL

## 2023-07-11 PROCEDURE — 80061 LIPID PANEL: CPT

## 2023-07-11 PROCEDURE — 82947 ASSAY GLUCOSE BLOOD QUANT: CPT

## 2023-07-11 PROCEDURE — 99203 OFFICE O/P NEW LOW 30 MIN: CPT | Performed by: STUDENT IN AN ORGANIZED HEALTH CARE EDUCATION/TRAINING PROGRAM

## 2023-07-11 NOTE — PROGRESS NOTES
"I have personally examined this patient and agree with the medical student's documentation and plan of care. I have reviewed and amended the medical student's note as necessary.The documentation accurately reflects my clinical observations, diagnoses, treatment and follow-up plans.     Jasvir Leigh MD  Dermatology Staff      [facility] Dermatology Note  Encounter Date: Jul 11, 2023  Office Visit     Dermatology Problem List:  1. History of dysplastic nevi  -Compound dysplastic nevus with mild atypia, right upper chest, s/p excised with biopsy 11/25/2015  -Compound dysplastic nevus with mild atypia, right flank, s/p excised with biopsy 8/19/2015  2. Epidermal inclusion cyst, left abdomen, s/p removal 2016     ____________________________________________    Assessment & Plan:    # SKs, left temple  Reviewed benign nature of this spot. Counseled patient that he may get more of these spots as he ages.     #Multiple benign nevi, SKs, and lentigines   #History of dysplastic nevi    - ABCDEs and sun protection reviewed (handout provided)   - Counseled patient to monitor spots of past biopsies for recurrence    Procedures Performed: None     Follow-up: 1 year     Staff and Medical Student:     Oanh Cochran, MS4      ____________________________________________    CC: No chief complaint on file.    HPI:  Mr. Penelope Capone is a(n) 53 year old male who presents today as a return patient for spots on left temple. He states that these spots have started to go away recently, but used to feel \"scabby\" or \"scaly\" and was there for about one month. He denies the spots bleeding. He denies any other spots of concern, or spots that are changing, bleeding, or growing.     Patient is otherwise feeling well, without additional skin concerns.    Labs:  N/A    Physical Exam:  Vitals: There were no vitals taken for this visit.  SKIN: Total skin excluding the undergarment areas was performed. The exam included the " head/face, neck, both arms, chest, back, abdomen, both legs, digits and/or nails.   - Two scaly brown papules on left temple   - Well healed surgical scar on the left abdomen with an even, light brown macule extending from scar  - Scar on right thigh   - Two-tone  Brown-to-dark brown 3 mm regular macule on left shoulder   - Approximately 2 mm even brown macule on right temple   - Regular brown macules and papules scattered over extremities and trunk  - Sunburst tan macules of left shoulder  - No other lesions of concern on areas examined.     Medications:  Current Outpatient Medications   Medication     EPINEPHrine (EPIPEN/ADRENACLICK/OR ANY BX GENERIC EQUIV) 0.3 MG/0.3ML injection 2-pack     Loratadine (CLARITIN PO)     No current facility-administered medications for this visit.      Past Medical History:   Patient Active Problem List   Diagnosis     SUDHIR (obstructive sleep apnea)     Past Medical History:   Diagnosis Date     Dysplastic nevus     2015     Latent tuberculosis     Tested for immigration 1994. Received 6-9 months of treatment with [facility] ~2004     SUDHIR (obstructive sleep apnea)      Sensory hearing loss, unilateral     Left     Tinea cruris      Urticaria         CC Referred Self, MD  No address on file on close of this encounter.

## 2023-07-11 NOTE — PATIENT INSTRUCTIONS
Patient Education     Checking for Skin Cancer  You can help find cancer early by checking your skin each month. There are 3 main kinds of skin cancer: melanoma, basal cell carcinoma, and squamous cell carcinoma. Doing monthly skin checks is the best way to find new marks, sores, or skin changes. Follow these instructions for checking your skin.   The ABCDEs of checking moles for melanoma   Check your moles or growths for signs of melanoma using ABCDE:   Asymmetry: The sides of the mole or growth don t match.  Border: The edges are ragged, notched, or blurred.  Color: The color within the mole or growth varies. It could be black, brown, tan, white, or shades of red, gray, or blue.  Diameter: The mole or growth is larger than   inch or 6 mm (size of a pencil eraser).  Evolving: The size, shape, texture, or color of the mole or growth is changing.     ABCDE's of moles on light skin.        ABCDE's of moles on dark skin may be harder to identify.     Checking for other types of skin cancer  Basal cell carcinoma or squamous cell carcinoma cause symptoms like:     A spot or mole that looks different from all other marks on your skin  Changes in how an area feels, such as itching, tenderness, or pain  Changes in the skin's surface, such as oozing, bleeding, or scaliness  A sore that doesn't heal  New swelling, redness, or spread of color beyond the border of a mole    Who s at risk?  Anyone of any skin color can get skin cancer. But you're at greater risk if you have:   Fair skin that freckles easily and burns instead of tanning  Light-colored or red hair  Light-colored eyes  Many moles or abnormal moles on your skin  A long history of unprotected exposure to sunlight or tanning beds  A history of many blistering sunburns as a child or teen  A family history of skin cancer  Been exposed to radiation or chemicals  A weakened immune system  Been exposed to arsenic  If you've had skin cancer in the past, you're at high risk  of having it again.   How to check your skin  Do your monthly skin checkups in front of a full-length mirror. Use a room with good lighting so it's easier to see. Use a hand mirror to look at hard-to-see places like your buttocks and back. You can also have a trusted friend or family member help you with these checks. Check every part of your body, including your:   Head (ears, face, neck, and scalp)  Torso (front, back, sides, and under breasts)  Arms (tops, undersides, and armpits)  Hands (palms, backs, and fingers, including under the nails)  Lower back, buttocks, and genitals  Legs (front, back, and sides)  Feet (tops, soles, toes, including under the nails, and between toes)  Watch for new spots on your skin or a spot that's changing in color, shape, size.   If you have a lot of moles, take digital photos of them each month. Make sure to take photos both up close and from a distance. These can help you see if any moles change over time.   Know your skin  Most skin changes aren't cancer. But if you see any changes in your skin, call your healthcare provider right away. Only they can tell you if a change is a problem. If you have skin cancer, seeing your provider can be the first step to getting the treatment that could save your life.   Aimee last reviewed this educational content on 10/1/2021    [phone] [employer] All rights reserved. This information is not intended as a substitute for professional medical care. Always follow your healthcare professional's instructions.

## 2023-07-11 NOTE — LETTER
"7/11/2023       RE: Penelope Capone  [address]  [facility] [address]     Dear Colleague,    Thank you for referring your patient, Penelope Capone, to the [facility] DERMATOLOGY CLINIC [place] at [facility]. Please see a copy of my visit note below.    I have personally examined this patient and agree with the medical student's documentation and plan of care. I have reviewed and amended the medical student's note as necessary.The documentation accurately reflects my clinical observations, diagnoses, treatment and follow-up plans.     Jasvir Leigh MD  Dermatology Staff      [facility] Dermatology Note  Encounter Date: Jul 11, 2023  Office Visit     Dermatology Problem List:  1. History of dysplastic nevi  -Compound dysplastic nevus with mild atypia, right upper chest, s/p excised with biopsy 11/25/2015  -Compound dysplastic nevus with mild atypia, right flank, s/p excised with biopsy 8/19/2015  2. Epidermal inclusion cyst, left abdomen, s/p removal 2016     ____________________________________________    Assessment & Plan:    # SKs, left temple  Reviewed benign nature of this spot. Counseled patient that he may get more of these spots as he ages.     #Multiple benign nevi, SKs, and lentigines   #History of dysplastic nevi    - ABCDEs and sun protection reviewed (handout provided)   - Counseled patient to monitor spots of past biopsies for recurrence    Procedures Performed: None     Follow-up: 1 year     Staff and Medical Student:     Oanh Cochran, MS4      ____________________________________________    CC: No chief complaint on file.    HPI:  Mr. Penelope Capone is a(n) 53 year old male who presents today as a return patient for spots on left temple. He states that these spots have started to go away recently, but used to feel \"scabby\" or \"scaly\" and was there for about one month. He denies the spots bleeding. He denies any other " spots of concern, or spots that are changing, bleeding, or growing.     Patient is otherwise feeling well, without additional skin concerns.    Labs:  N/A    Physical Exam:  Vitals: There were no vitals taken for this visit.  SKIN: Total skin excluding the undergarment areas was performed. The exam included the head/face, neck, both arms, chest, back, abdomen, both legs, digits and/or nails.   - Two scaly brown papules on left temple   - Well healed surgical scar on the left abdomen with an even, light brown macule extending from scar  - Scar on right thigh   - Two-tone  Brown-to-dark brown 3 mm regular macule on left shoulder   - Approximately 2 mm even brown macule on right temple   - Regular brown macules and papules scattered over extremities and trunk  - Sunburst tan macules of left shoulder  - No other lesions of concern on areas examined.     Medications:  Current Outpatient Medications   Medication    EPINEPHrine (EPIPEN/ADRENACLICK/OR ANY BX GENERIC EQUIV) 0.3 MG/0.3ML injection 2-pack    Loratadine (CLARITIN PO)     No current facility-administered medications for this visit.      Past Medical History:   Patient Active Problem List   Diagnosis    SUDHIR (obstructive sleep apnea)     Past Medical History:   Diagnosis Date    Dysplastic nevus     2015    Latent tuberculosis     Tested for immigration 1994. Received 6-9 months of treatment with [facility] ~2004    SUDHIR (obstructive sleep apnea)     Sensory hearing loss, unilateral     Left    Tinea cruris     Urticaria         CC Referred Self, MD  No address on file on close of this encounter.

## 2023-07-11 NOTE — NURSING NOTE
Dermatology Rooming Note    Penelope Capone's goals for this visit include:   Chief Complaint   Patient presents with     Skin Check     FBSE with spots of concern on upper forehead.     Jag Hein, EMT-B

## 2023-07-17 ENCOUNTER — TELEPHONE (OUTPATIENT)
Dept: DERMATOLOGY | Facility: CLINIC | Age: 53
End: 2023-07-17
Payer: COMMERCIAL

## 2023-07-17 NOTE — TELEPHONE ENCOUNTER
Lvm for the patient to call back and schedule the following:    Appointment type: Return  Provider: Dr. Leigh  Return date: July 2024    Specialty phone number: [phone]

## 2023-07-28 ENCOUNTER — LAB (OUTPATIENT)
Dept: LAB | Facility: CLINIC | Age: 53
End: 2023-07-28
Payer: COMMERCIAL

## 2023-07-28 ENCOUNTER — TELEPHONE (OUTPATIENT)
Dept: DERMATOLOGY | Facility: CLINIC | Age: 53
End: 2023-07-28
Payer: COMMERCIAL

## 2023-07-28 DIAGNOSIS — Z30.2 ENCOUNTER FOR STERILIZATION: ICD-10-CM

## 2023-07-28 LAB
SEMEN ANALYSIS P VAS PNL: NORMAL
SPERM MOTILE SMN QL MICRO: NORMAL

## 2023-07-28 PROCEDURE — 89321 SEMEN ANAL SPERM DETECTION: CPT

## 2023-07-28 NOTE — TELEPHONE ENCOUNTER
Patient Contacted pt stated he will not be scheduling the following:    Appointment type: Return  Provider: Dr. Leigh  Return date: July 2024  Specialty phone number: [phone]

## 2024-02-08 ENCOUNTER — OFFICE VISIT (OUTPATIENT)
Dept: URGENT CARE | Facility: URGENT CARE | Age: 54
End: 2024-02-08
Payer: COMMERCIAL

## 2024-02-08 VITALS
DIASTOLIC BLOOD PRESSURE: 86 MMHG | BODY MASS INDEX: 22.73 KG/M2 | HEIGHT: 68 IN | OXYGEN SATURATION: 100 % | SYSTOLIC BLOOD PRESSURE: 120 MMHG | RESPIRATION RATE: 16 BRPM | HEART RATE: 82 BPM | WEIGHT: 150 LBS | TEMPERATURE: 97.5 F

## 2024-02-08 DIAGNOSIS — H10.9 BACTERIAL CONJUNCTIVITIS: Primary | ICD-10-CM

## 2024-02-08 PROCEDURE — 99213 OFFICE O/P EST LOW 20 MIN: CPT | Performed by: NURSE PRACTITIONER

## 2024-02-08 RX ORDER — POLYMYXIN B SULFATE AND TRIMETHOPRIM 1; 10000 MG/ML; [USP'U]/ML
1-2 SOLUTION OPHTHALMIC EVERY 4 HOURS
Qty: 10 ML | Refills: 0 | Status: SHIPPED | OUTPATIENT
Start: 2024-02-08 | End: 2024-02-13

## 2024-02-09 NOTE — PROGRESS NOTES
HPI  Patient is a 54-year-old male who presents with left eye irritation, tearing, and drainage.  He reports he awoke this morning with the symptoms.  He endorses that he currently has an upper respiratory infection that is improving but has been present for about 5 days.  Symptoms include cough, nasal congestion, and a mild sore throat.  He has not been taking any medications for his symptoms.    ROS   ROS: 10 point ROS neg other than the symptoms noted above in the HPI.      Physical Exam  Constitutional:       General: He is not in acute distress.  HENT:      Right Ear: Tympanic membrane normal.      Left Ear: Tympanic membrane normal.      Nose: Congestion present.      Mouth/Throat:      Mouth: Mucous membranes are moist.   Eyes:      General: Lids are normal.         Left eye: No foreign body.      Conjunctiva/sclera:      Left eye: Left conjunctiva is injected. Exudate present.   Neck:      Comments: Negative for preauricular nodes on the left  Cardiovascular:      Rate and Rhythm: Normal rate.      Heart sounds: Normal heart sounds.   Pulmonary:      Effort: Pulmonary effort is normal.      Breath sounds: Normal breath sounds.   Abdominal:      Tenderness: There is no abdominal tenderness.   Lymphadenopathy:      Cervical: No cervical adenopathy.   Skin:     General: Skin is warm and dry.   Neurological:      Mental Status: He is alert and oriented to person, place, and time.       Assessment:  1. Bacterial conjunctivitis        Plan:  Orders Placed This Encounter    polymixin b-trimethoprim (POLYTRIM) 78946-2.1 UNIT/ML-% ophthalmic solution     Comfort measures for upper respiratory infection including Robitussin for cough, Tylenol for pain or fever, Cepacol lozenges for sore throat, rest and fluids.  Instructions regarding self-care of patient/child reviewed.   Written instructions provided in after visit summary and reviewed.  Patient instructed to see primary care provider for new or persistent symptoms.    Red flag symptoms reviewed and patient has been instructed to seek emergent care  Please contact pharmacy for medication questions.  Patient instructed to take medications as directed on package.      DENZEL Schuster CNP      The use of Dragon/[employer] dictation services may have been used to construct the content in this note;   any grammatical or spelling errors are non-intentional. Please contact the author of this note directly if you   are in need of any clarification.

## 2024-02-12 ENCOUNTER — OFFICE VISIT (OUTPATIENT)
Dept: FAMILY MEDICINE | Facility: CLINIC | Age: 54
End: 2024-02-12
Payer: COMMERCIAL

## 2024-02-12 ENCOUNTER — TELEPHONE (OUTPATIENT)
Dept: FAMILY MEDICINE | Facility: CLINIC | Age: 54
End: 2024-02-12

## 2024-02-12 VITALS
TEMPERATURE: 98.2 F | WEIGHT: 154 LBS | OXYGEN SATURATION: 98 % | BODY MASS INDEX: 23.42 KG/M2 | HEART RATE: 69 BPM | SYSTOLIC BLOOD PRESSURE: 115 MMHG | DIASTOLIC BLOOD PRESSURE: 68 MMHG

## 2024-02-12 DIAGNOSIS — H10.33 ACUTE CONJUNCTIVITIS OF BOTH EYES, UNSPECIFIED ACUTE CONJUNCTIVITIS TYPE: Primary | ICD-10-CM

## 2024-02-12 RX ORDER — OFLOXACIN 3 MG/ML
1-2 SOLUTION/ DROPS OPHTHALMIC
Qty: 5 ML | Refills: 0 | Status: SHIPPED | OUTPATIENT
Start: 2024-02-12 | End: 2024-08-16

## 2024-02-12 NOTE — NURSING NOTE
"Penelope  54 year old    Chief Complaint   Patient presents with    Conjunctivitis     Pt's daughter had pink eye 2 weeks ago. On Thursday, L eye was red and went to Urgent care, got antibiotic eye drops. R eye became red the day after. Pt is also experiencing congestion, cough, runny nose, and sore throat. Wife has been testing neg for COVID.            Blood pressure 115/68, pulse 69, temperature 98.2  F (36.8  C), temperature source Skin, weight 69.9 kg (154 lb), SpO2 98%. Body mass index is 23.42 kg/m .    Patient Active Problem List   Diagnosis    SUDHIR (obstructive sleep apnea)              Wt Readings from Last 2 Encounters:   24 69.9 kg (154 lb)   24 68 kg (150 lb)       BP Readings from Last 3 Encounters:   24 115/68   24 120/86   07/10/23 118/81                Current Outpatient Medications   Medication    Loratadine (CLARITIN PO)    polymixin b-trimethoprim (POLYTRIM) 29986-0.1 UNIT/ML-% ophthalmic solution    EPINEPHrine (EPIPEN/ADRENACLICK/OR ANY BX GENERIC EQUIV) 0.3 MG/0.3ML injection 2-pack     No current facility-administered medications for this visit.              Social History     Tobacco Use    Smoking status: Former     Packs/day: 0.30     Years: 18.00     Additional pack years: 0.00     Total pack years: 5.40     Types: Cigarettes     Quit date: 2005     Years since quittin.1    Smokeless tobacco: Never   Vaping Use    Vaping Use: Never used   Substance Use Topics    Alcohol use: Not Currently    Drug use: No              Health Maintenance Due   Topic Date Due    ADVANCE CARE PLANNING  Never done    HEPATITIS B IMMUNIZATION (1 of 3 - 3-dose series) Never done    INFLUENZA VACCINE (1) 2023    COVID-19 Vaccine ( season) 2023            No results found for: \"PAP\"           2024 2:54 PM    "

## 2024-02-12 NOTE — PATIENT INSTRUCTIONS
ASSESSMENT and PLAN:     Penelope is a 55 yo with recent URI symptoms who now has conjunctivitis, bilaterally that is not improving with Polymixin and trimethoprim drops.   PLAN:    Stop the above drops  Start Ofloxacin drops.  Strict hand washing. Also, change pillow case and towels  If no improvement in 3-4 days, see Ophthalmology (referral placed today. Please cancel if improving)        Bi Stevens MD  Family Medicine and Sports Medicine  [facility]

## 2024-02-12 NOTE — TELEPHONE ENCOUNTER
Pt called to report UC visit on 2/8/24 where he was diagnosed with bacterial conjunctivitis and prescribed polymixin b-trimethoprim ophthalmic solution.  Pt reports using eyedrops as ordered. He reports no improvement in his left eye and the infection has spread to his right eye.   Also reports his daughter is being treated for bacterial conjunctivitis.  Scheduled with Dr. Stevens on 2/12/24.    LISETH Leonard, RN  02/12/24, 9:22 AM

## 2024-02-12 NOTE — PROGRESS NOTES
ASSESSMENT and PLAN:     Penelope is a 53 yo with recent URI symptoms who now has conjunctivitis, bilaterally that is not improving with Polymixin and trimethoprim drops.   PLAN:    Stop the above drops  Start Ofloxacin drops.  Strict hand washing. Also, change pillow case and towels  If no improvement in 3-4 days, see Ophthalmology (referral placed today. Please cancel if improving)        Bi Stevens MD  Family Medicine and Sports Medicine  [facility]      Medical assistant intake:  Penelope Capone is a 54 year old male who presents to [facility] today for Conjunctivitis (Pt's daughter had pink eye 2 weeks ago. On Thursday, L eye was red and went to Urgent care, got antibiotic eye drops. R eye became red the day after. Pt is also experiencing congestion, cough, runny nose, and sore throat. Wife has been testing neg for COVID.)      SUBJECTIVE:   Penelope   Is a 54-year-old  at the [facility] who presents today due to conjunctivitis for about the past 5 days.  It started in his left eye and then transferred  to also include his right eye.  He has been feeling some mild symptoms of an upper respiratory infection.  He has not had a fever.  His family  members have also been ill with a URI.  They have tested negative for COVID.    Review Of Systems:      He has some congestion but no body aches.  No headache.  No fevers.    Problem list per EMR:  Patient Active Problem List   Diagnosis    SUDHIR (obstructive sleep apnea)       Current Outpatient Medications   Medication Sig Dispense Refill    Loratadine (CLARITIN PO) Take 1 tablet by mouth as needed      polymixin b-trimethoprim (POLYTRIM) 62915-9.1 UNIT/ML-% ophthalmic solution Place 1-2 drops Into the left eye every 4 hours for 5 days 10 mL 0    EPINEPHrine (EPIPEN/ADRENACLICK/OR ANY BX GENERIC EQUIV) 0.3 MG/0.3ML injection 2-pack Inject 0.3 mLs (0.3 mg) into the muscle once as needed for anaphylaxis (Patient not taking:  Reported on 2/12/2024) 0.6 mL 1       Allergies   Allergen Reactions    Shellfish Allergy     Adhesive Tape Itching and Rash        Social:      at [facility]    OBJECTIVE    Vitals: /68 (BP Location: Left arm, Patient Position: Sitting, Cuff Size: Adult Regular)   Pulse 69   Temp 98.2  F (36.8  C) (Skin)   Wt 69.9 kg (154 lb)   SpO2 98%   BMI 23.42 kg/m    BMI= Body mass index is 23.42 kg/m .   he appears well other than having injected sclera bilaterally.  His funduscopic exam is normal and his eyes move in all directions without any limitations.  His lids do not appear swollen.   No focal abnormalities of the lids. His vision is intact.    no lymphadenopathy in the face or neck region.    Lungs are clear to auscultation throughout    Cardiovascular-regular rate and rhythm without murmurs  SEE TOP OF NOTE FOR ASSESSMENT AND PLAN    --Bi Stevens MD  [facility], Department of Family Medicine and Community Health

## 2024-02-13 ENCOUNTER — MYC MEDICAL ADVICE (OUTPATIENT)
Dept: FAMILY MEDICINE | Facility: CLINIC | Age: 54
End: 2024-02-13

## 2024-08-16 ENCOUNTER — OFFICE VISIT (OUTPATIENT)
Dept: FAMILY MEDICINE | Facility: CLINIC | Age: 54
End: 2024-08-16
Payer: COMMERCIAL

## 2024-08-16 VITALS
HEIGHT: 68 IN | OXYGEN SATURATION: 98 % | RESPIRATION RATE: 12 BRPM | BODY MASS INDEX: 23.34 KG/M2 | HEART RATE: 59 BPM | WEIGHT: 154 LBS | DIASTOLIC BLOOD PRESSURE: 74 MMHG | TEMPERATURE: 98 F | SYSTOLIC BLOOD PRESSURE: 120 MMHG

## 2024-08-16 DIAGNOSIS — R79.89 ELEVATED FERRITIN: ICD-10-CM

## 2024-08-16 DIAGNOSIS — L50.0 ALLERGIC URTICARIA: ICD-10-CM

## 2024-08-16 DIAGNOSIS — R00.2 PALPITATIONS: ICD-10-CM

## 2024-08-16 DIAGNOSIS — Z13.228 SCREENING FOR METABOLIC DISORDER: ICD-10-CM

## 2024-08-16 DIAGNOSIS — Z13.220 SCREENING FOR HYPERLIPIDEMIA: ICD-10-CM

## 2024-08-16 DIAGNOSIS — Z00.00 ROUTINE GENERAL MEDICAL EXAMINATION AT A HEALTH CARE FACILITY: Primary | ICD-10-CM

## 2024-08-16 LAB
ALBUMIN SERPL BCG-MCNC: 4.5 G/DL (ref 3.5–5.2)
ALP SERPL-CCNC: 74 U/L (ref 40–150)
ALT SERPL W P-5'-P-CCNC: 23 U/L (ref 0–70)
ANION GAP SERPL CALCULATED.3IONS-SCNC: 11 MMOL/L (ref 7–15)
AST SERPL W P-5'-P-CCNC: 17 U/L (ref 0–45)
BILIRUB DIRECT SERPL-MCNC: 0.23 MG/DL (ref 0–0.3)
BILIRUB SERPL-MCNC: 0.7 MG/DL
BUN SERPL-MCNC: 15.3 MG/DL (ref 6–20)
CALCIUM SERPL-MCNC: 9.6 MG/DL (ref 8.8–10.4)
CHLORIDE SERPL-SCNC: 101 MMOL/L (ref 98–107)
CHOLEST SERPL-MCNC: 226 MG/DL
CREAT SERPL-MCNC: 0.97 MG/DL (ref 0.67–1.17)
EGFRCR SERPLBLD CKD-EPI 2021: >90 ML/MIN/1.73M2
ERYTHROCYTE [DISTWIDTH] IN BLOOD BY AUTOMATED COUNT: 11.7 % (ref 10–15)
FASTING STATUS PATIENT QL REPORTED: ABNORMAL
FASTING STATUS PATIENT QL REPORTED: ABNORMAL
FERRITIN SERPL-MCNC: 507 NG/ML (ref 31–409)
GLUCOSE SERPL-MCNC: 104 MG/DL (ref 70–99)
HBA1C MFR BLD: 5 % (ref 0–5.6)
HCO3 SERPL-SCNC: 27 MMOL/L (ref 22–29)
HCT VFR BLD AUTO: 42.3 % (ref 40–53)
HDLC SERPL-MCNC: 67 MG/DL
HGB BLD-MCNC: 14.4 G/DL (ref 13.3–17.7)
IRON BINDING CAPACITY (ROCHE): 341 UG/DL (ref 240–430)
IRON SATN MFR SERPL: 37 % (ref 15–46)
IRON SERPL-MCNC: 127 UG/DL (ref 61–157)
LDLC SERPL CALC-MCNC: 117 MG/DL
MCH RBC QN AUTO: 33 PG (ref 26.5–33)
MCHC RBC AUTO-ENTMCNC: 34 G/DL (ref 31.5–36.5)
MCV RBC AUTO: 97 FL (ref 78–100)
NONHDLC SERPL-MCNC: 159 MG/DL
PLATELET # BLD AUTO: 170 10E3/UL (ref 150–450)
POTASSIUM SERPL-SCNC: 4.8 MMOL/L (ref 3.4–5.3)
PROT SERPL-MCNC: 7.3 G/DL (ref 6.4–8.3)
RBC # BLD AUTO: 4.37 10E6/UL (ref 4.4–5.9)
SODIUM SERPL-SCNC: 139 MMOL/L (ref 135–145)
TRIGL SERPL-MCNC: 209 MG/DL
TSH SERPL DL<=0.005 MIU/L-ACNC: 1.57 UIU/ML (ref 0.3–4.2)
WBC # BLD AUTO: 4.2 10E3/UL (ref 4–11)

## 2024-08-16 PROCEDURE — 83550 IRON BINDING TEST: CPT | Performed by: FAMILY MEDICINE

## 2024-08-16 PROCEDURE — 82248 BILIRUBIN DIRECT: CPT | Performed by: FAMILY MEDICINE

## 2024-08-16 PROCEDURE — 82728 ASSAY OF FERRITIN: CPT | Performed by: FAMILY MEDICINE

## 2024-08-16 PROCEDURE — 82374 ASSAY BLOOD CARBON DIOXIDE: CPT | Performed by: FAMILY MEDICINE

## 2024-08-16 PROCEDURE — 84443 ASSAY THYROID STIM HORMONE: CPT | Performed by: FAMILY MEDICINE

## 2024-08-16 PROCEDURE — 84478 ASSAY OF TRIGLYCERIDES: CPT | Performed by: FAMILY MEDICINE

## 2024-08-16 PROCEDURE — 83540 ASSAY OF IRON: CPT | Performed by: FAMILY MEDICINE

## 2024-08-16 RX ORDER — EPINEPHRINE 0.3 MG/.3ML
0.3 INJECTION SUBCUTANEOUS
Qty: 0.6 ML | Refills: 1 | Status: SHIPPED | OUTPATIENT
Start: 2024-08-16

## 2024-08-16 SDOH — HEALTH STABILITY: PHYSICAL HEALTH: ON AVERAGE, HOW MANY DAYS PER WEEK DO YOU ENGAGE IN MODERATE TO STRENUOUS EXERCISE (LIKE A BRISK WALK)?: 3 DAYS

## 2024-08-16 SDOH — HEALTH STABILITY: PHYSICAL HEALTH: ON AVERAGE, HOW MANY MINUTES DO YOU ENGAGE IN EXERCISE AT THIS LEVEL?: 20 MIN

## 2024-08-16 ASSESSMENT — SOCIAL DETERMINANTS OF HEALTH (SDOH)
HOW OFTEN DO YOU GET TOGETHER WITH FRIENDS OR RELATIVES?: ONCE A WEEK
HOW OFTEN DO YOU GET TOGETHER WITH FRIENDS OR RELATIVES?: ONCE A WEEK

## 2024-08-16 NOTE — NURSING NOTE
"Penelope  54 year old    Chief Complaint   Patient presents with    Physical            Blood pressure 120/74, pulse 59, temperature 98  F (36.7  C), temperature source Skin, resp. rate 12, height 1.728 m (5' 8.03\"), weight 69.9 kg (154 lb), SpO2 98%. Body mass index is 23.39 kg/m .    Patient Active Problem List   Diagnosis    SUDHIR (obstructive sleep apnea)              Wt Readings from Last 2 Encounters:   24 69.9 kg (154 lb)   24 69.9 kg (154 lb)       BP Readings from Last 3 Encounters:   24 120/74   24 115/68   24 120/86                Current Outpatient Medications   Medication Sig Dispense Refill    Loratadine (CLARITIN PO) Take 1 tablet by mouth as needed      EPINEPHrine (EPIPEN/ADRENACLICK/OR ANY BX GENERIC EQUIV) 0.3 MG/0.3ML injection 2-pack Inject 0.3 mLs (0.3 mg) into the muscle once as needed for anaphylaxis (Patient not taking: Reported on 2024) 0.6 mL 1    ofloxacin (OCUFLOX) 0.3 % ophthalmic solution Place 1-2 drops into both eyes every 2 hours (while awake) 5 mL 0     No current facility-administered medications for this visit.              Social History     Tobacco Use    Smoking status: Former     Current packs/day: 0.00     Average packs/day: 0.3 packs/day for 18.0 years (5.4 ttl pk-yrs)     Types: Cigarettes     Start date: 1987     Quit date: 2005     Years since quittin.6    Smokeless tobacco: Never   Vaping Use    Vaping status: Never Used   Substance Use Topics    Alcohol use: Yes     Alcohol/week: 7.0 - 14.0 standard drinks of alcohol     Types: 7 - 14 Standard drinks or equivalent per week    Drug use: Not Currently              Health Maintenance Due   Topic Date Due    ADVANCE CARE PLANNING  Never done    HEPATITIS B IMMUNIZATION (1 of 3 - 19+ 3-dose series) Never done    COVID-19 Vaccine (2023- season) 2023    YEARLY PREVENTIVE VISIT  07/10/2024            No results found for: \"PAP\"           2024 7:59 AM    "

## 2024-08-16 NOTE — PROGRESS NOTES
Preventive Care Visit  [facility]  Cyndy Louie MD, Family Medicine  Aug 16, 2024      Assessment & Plan     Penelope was seen today for physical.    Diagnoses and all orders for this visit:    Routine general medical examination at a health care facility    Screening for hyperlipidemia  -     Lipid panel; Future    Screening for metabolic disorder  -     Basic metabolic panel; Future  -     Hemoglobin A1c; Future    Palpitations  -     CBC with platelets; Future  -     Ferritin; Future  -     TSH with free T4 reflex; Future    Allergic urticaria  -     EPINEPHrine (ANY BX GENERIC EQUIV) 0.3 MG/0.3ML injection 2-pack; Inject 0.3 mLs (0.3 mg) into the muscle once as needed for anaphylaxis      Check CBC, ferritin and TSH given palpitations  Given infrequent nature and lack of red flag symptoms, will hold off further evaluation for now. If becoming more frequent or more symptomatic, recommend Zio patch for evaluation.    Counseling  Appropriate preventive services were addressed with this patient via screening, questionnaire, or discussion as appropriate for fall prevention, nutrition, physical activity, Tobacco-use cessation, social engagement, weight loss and cognition.  Checklist reviewing preventive services available has been given to the patient.  Reviewed patient's diet, addressing concerns and/or questions.   He is at risk for lack of exercise and has been provided with information to increase physical activity for the benefit of his well-being.   He is at risk for psychosocial distress and has been provided with information to reduce risk.   The patient reports drinking more than 3 alcoholic drinks per day and/or more than 7 drhnks per week. The patient was counseled and given information about possible harmful effects of excessive alcohol intake.      Return in about 53 weeks (around 8/22/2025) for Annual Wellness Visit.    Alex Negrete is a 54 year old, presenting for the  following:  Physical       Health Care Directive  Patient does not have a Health Care Directive or Living Will: Discussed advance care planning with patient; information given to patient to review.    HPI    Saw Dr. Warren last year for physical, had some heart palpitations at that time  Has stayed about the same  Maybe notices more in the middle of the night  Can last for 30-60 minutes.  1x/month  No CP, chest tightness, SOB associated    Has not needed epi-pen    Uses oral appliance for SUDHIR  Just got a new oral appliance. It works, wife says this helps    Has mucous every morning in the back of the throat. Had blood one time. Saw ENT. Thought this was related to reflux. Was told to take papaya extract, doesn't feel like this is helping. No specific dietary symptoms    Left ear - hearing loss. Brain MRI was fine.  Doesn't feel like it is significant enough he would consider a hearing aid right now. Does have some tinnitus    Neck pain/cervical radiculopathy is currently manageable. Left sided radiculopathy. Previously was seeing an osteopathic provider, will follow-up with them as needed.    Mood - good. Stressful month with getting back to school          8/16/2024   General Health   How would you rate your overall physical health? Good   Feel stress (tense, anxious, or unable to sleep) Only a little      (!) STRESS CONCERN      8/16/2024   Nutrition   Three or more servings of calcium each day? (!) I DON'T KNOW   Diet: Breakfast skipped    Gluten-free/reduced   How many servings of fruit and vegetables per day? (!) 0-1   How many sweetened beverages each day? 0-1       Multiple values from one day are sorted in reverse-chronological order         8/16/2024   Exercise   Days per week of moderate/strenous exercise 3 days   Average minutes spent exercising at this level 20 min            8/16/2024   Social Factors   Frequency of gathering with friends or relatives Once a week   Worry food won't last until get  money to buy more No   Food not last or not have enough money for food? No   Do you have housing? (Housing is defined as stable permanent housing and does not include staying ouside in a car, in a tent, in an abandoned building, in an overnight shelter, or couch-surfing.) Yes   Are you worried about losing your housing? No   Lack of transportation? No   Unable to get utilities (heat,electricity)? No            8/16/2024   Fall Risk   Fallen 2 or more times in the past year? No    No   Trouble with walking or balance? No    No       Multiple values from one day are sorted in reverse-chronological order          8/16/2024   Dental   Dentist two times every year? Yes            8/16/2024   TB Screening   Were you born outside of the [place]? Yes        Today's PHQ-2 Score:       2/12/2024     2:52 PM   PHQ-2 ( 1999 [employer])   Q1: Little interest or pleasure in doing things 0   Q2: Feeling down, depressed or hopeless 0   PHQ-2 Score 0         8/16/2024   Substance Use   Alcohol more than 3/day or more than 7/wk Yes   How often do you have a drink containing alcohol 4 or more times a week   How many alcohol drinks on typical day 1 or 2   How often do you have 5+ drinks at one occasion Never   Audit 2/3 Score 0   How often not able to stop drinking once started Never   How often failed to do what normally expected Never   How often needed first drink in am after a heavy drinking session Never   How often feeling of guilt or remorse after drinking Never   How often unable to remember what happened the night before Never   Have you or someone else been injured because of your drinking No   Has anyone been concerned or suggested you cut down on drinking No   TOTAL SCORE - AUDIT 4   Do you use any other substances recreationally? No        Social History     Tobacco Use    Smoking status: Former     Current packs/day: 0.00     Average packs/day: 0.3 packs/day for 18.0 years (5.4 ttl pk-yrs)     Types: Cigarettes     Start date:  "1987     Quit date: 2005     Years since quittin.6    Smokeless tobacco: Never   Vaping Use    Vaping status: Never Used   Substance Use Topics    Alcohol use: Yes     Alcohol/week: 7.0 - 14.0 standard drinks of alcohol     Types: 7 - 14 Standard drinks or equivalent per week    Drug use: Not Currently     Types: Marijuana     Comment: rare THC use           2024   STI Screening   New sexual partner(s) since last STI/HIV test? No      Last PSA: No results found for: \"PSA\"  ASCVD Risk   The 10-year ASCVD risk score (Elizabeth PARRA, et al., 2019) is: 4.1%    Values used to calculate the score:      Age: 54 years      Sex: Male      Is Non- : No      Diabetic: No      Tobacco smoker: No      Systolic Blood Pressure: 120 mmHg      Is BP treated: No      HDL Cholesterol: 74 mg/dL      Total Cholesterol: 206 mg/dL      Reviewed and updated as needed this visit by Provider   Tobacco  Allergies  Meds  Problems  Med Hx  Surg Hx  Fam Hx  Soc   Hx Sexual Activity             Objective    Exam  /74 (BP Location: Left arm, Patient Position: Sitting, Cuff Size: Adult Regular)   Pulse 59   Temp 98  F (36.7  C) (Skin)   Resp 12   Ht 1.728 m (5' 8.03\")   Wt 69.9 kg (154 lb)   SpO2 98%   BMI 23.39 kg/m     Estimated body mass index is 23.39 kg/m  as calculated from the following:    Height as of this encounter: 1.728 m (5' 8.03\").    Weight as of this encounter: 69.9 kg (154 lb).    Physical Exam  GENERAL: alert and no distress  EYES: Eyes grossly normal to inspection, PERRL and conjunctivae and sclerae normal  HENT: ear canals and TM's normal, nose and mouth without ulcers or lesions  NECK: no adenopathy, no asymmetry, masses, or scars  RESP: lungs clear to auscultation - no rales, rhonchi or wheezes  CV: regular rate and rhythm, normal S1 S2, no S3 or S4, no murmur, click or rub, no peripheral edema  ABDOMEN: soft, nontender, no hepatosplenomegaly, no masses and " bowel sounds normal  MS: no gross musculoskeletal defects noted, no edema  SKIN: no suspicious lesions or rashes  NEURO: Normal strength and tone, mentation intact and speech normal  PSYCH: mentation appears normal, affect normal/bright        Signed Electronically by: Cyndy Louie MD

## 2024-08-16 NOTE — PATIENT INSTRUCTIONS
Nice to meet you today!    We will draw lab tests today. I will contact you in the next 2-3 business days with the results of these labs.    Refills of Epi-pen sent to pharmacy if this is most cost-effective for you now I would recommend carrying an epi-pen.    Follow-up in 1 year, sooner with concerns.    Patient Education   Preventive Care Advice   This is general advice given by our system to help you stay healthy. However, your care team may have specific advice just for you. Please talk to your care team about your preventive care needs.  Nutrition  Eat 5 or more servings of fruits and vegetables each day.  Try wheat bread, brown rice and whole grain pasta (instead of white bread, rice, and pasta).  Get enough calcium and vitamin D. Check the label on foods and aim for 100% of the RDA (recommended daily allowance).  Lifestyle  Exercise at least 150 minutes each week  (30 minutes a day, 5 days a week).  Do muscle strengthening activities 2 days a week. These help control your weight and prevent disease.  No smoking.  Wear sunscreen to prevent skin cancer.  Have a dental exam and cleaning every 6 months.  Yearly exams  See your health care team every year to talk about:  Any changes in your health.  Any medicines your care team has prescribed.  Preventive care, family planning, and ways to prevent chronic diseases.  Shots (vaccines)   HPV shots (up to age 26), if you've never had them before.  Hepatitis B shots (up to age 59), if you've never had them before.  COVID-19 shot: Get this shot when it's due.  Flu shot: Get a flu shot every year.  Tetanus shot: Get a tetanus shot every 10 years.  Pneumococcal, hepatitis A, and RSV shots: Ask your care team if you need these based on your risk.  Shingles shot (for age 50 and up)  General health tests  Diabetes screening:  Starting at age 35, Get screened for diabetes at least every 3 years.  If you are younger than age 35, ask your care team if you should be screened  for diabetes.  Cholesterol test: At age 39, start having a cholesterol test every 5 years, or more often if advised.  Bone density scan (DEXA): At age 50, ask your care team if you should have this scan for osteoporosis (brittle bones).  Hepatitis C: Get tested at least once in your life.  STIs (sexually transmitted infections)  Before age 24: Ask your care team if you should be screened for STIs.  After age 24: Get screened for STIs if you're at risk. You are at risk for STIs (including HIV) if:  You are sexually active with more than one person.  You don't use condoms every time.  You or a partner was diagnosed with a sexually transmitted infection.  If you are at risk for HIV, ask about PrEP medicine to prevent HIV.  Get tested for HIV at least once in your life, whether you are at risk for HIV or not.  Cancer screening tests  Cervical cancer screening: If you have a cervix, begin getting regular cervical cancer screening tests starting at age 21.  Breast cancer scan (mammogram): If you've ever had breasts, begin having regular mammograms starting at age 40. This is a scan to check for breast cancer.  Colon cancer screening: It is important to start screening for colon cancer at age 45.  Have a colonoscopy test every 10 years (or more often if you're at risk) Or, ask your provider about stool tests like a FIT test every year or Cologuard test every 3 years.  To learn more about your testing options, visit:   .  For help making a decision, visit:   https://bit.ly/bn89588.  Prostate cancer screening test: If you have a prostate, ask your care team if a prostate cancer screening test (PSA) at age 55 is right for you.  Lung cancer screening: If you are a current or former smoker ages 50 to 80, ask your care team if ongoing lung cancer screenings are right for you.  For informational purposes only. Not to replace the advice of your health care provider. Copyright   2023 [employer]. All rights reserved.  Clinically reviewed by the [facility] Transitions Program. Deezer [id number] - REV 01/24.

## 2025-03-05 ENCOUNTER — OFFICE VISIT (OUTPATIENT)
Dept: FAMILY MEDICINE | Facility: CLINIC | Age: 55
End: 2025-03-05
Payer: COMMERCIAL

## 2025-03-05 ENCOUNTER — NURSE TRIAGE (OUTPATIENT)
Dept: FAMILY MEDICINE | Facility: CLINIC | Age: 55
End: 2025-03-05

## 2025-03-05 VITALS
DIASTOLIC BLOOD PRESSURE: 83 MMHG | WEIGHT: 155 LBS | TEMPERATURE: 98.2 F | OXYGEN SATURATION: 100 % | BODY MASS INDEX: 23.55 KG/M2 | SYSTOLIC BLOOD PRESSURE: 117 MMHG | HEART RATE: 92 BPM | RESPIRATION RATE: 18 BRPM

## 2025-03-05 DIAGNOSIS — R00.2 PALPITATIONS: Primary | ICD-10-CM

## 2025-03-05 LAB
ANION GAP SERPL CALCULATED.3IONS-SCNC: 11 MMOL/L (ref 7–15)
BUN SERPL-MCNC: 12.2 MG/DL (ref 6–20)
CALCIUM SERPL-MCNC: 10.2 MG/DL (ref 8.8–10.4)
CHLORIDE SERPL-SCNC: 100 MMOL/L (ref 98–107)
CREAT SERPL-MCNC: 0.92 MG/DL (ref 0.67–1.17)
EGFRCR SERPLBLD CKD-EPI 2021: >90 ML/MIN/1.73M2
GLUCOSE SERPL-MCNC: 113 MG/DL (ref 70–99)
HCO3 SERPL-SCNC: 27 MMOL/L (ref 22–29)
MAGNESIUM SERPL-MCNC: 2.1 MG/DL (ref 1.7–2.3)
POTASSIUM SERPL-SCNC: 5 MMOL/L (ref 3.4–5.3)
SODIUM SERPL-SCNC: 138 MMOL/L (ref 135–145)
TSH SERPL DL<=0.005 MIU/L-ACNC: 1.92 UIU/ML (ref 0.3–4.2)

## 2025-03-05 PROCEDURE — 83735 ASSAY OF MAGNESIUM: CPT | Performed by: FAMILY MEDICINE

## 2025-03-05 PROCEDURE — 84443 ASSAY THYROID STIM HORMONE: CPT | Performed by: FAMILY MEDICINE

## 2025-03-05 PROCEDURE — 80048 BASIC METABOLIC PNL TOTAL CA: CPT | Performed by: FAMILY MEDICINE

## 2025-03-05 NOTE — NURSING NOTE
Penelope Capone arrived here on 3/5/2025 10:45 AM for 8-14 Days  Zio monitor placement per ordering provider: Dr. Tyron Vides, for the diagnosis Palpitations.  Patient s skin was prepped per protocol. Dr. Tyron Vides is the supervising MD.  Zio monitor was placed.  Instructions were reviewed with and given to the patient.  Patient verbalized understanding of wear, troubleshooting and monitor return instructions.  Sri Emerson LPN on 3/5/2025 at 10:45 AM

## 2025-03-05 NOTE — TELEPHONE ENCOUNTER
Reason for Disposition   Palpitations are a chronic symptom (recurrent or ongoing AND present > 4 weeks)    Answer Assessment - Initial Assessment Questions  1. DESCRIPTION: Fast and beating hard  2. ONSET: Yesterday, 3/4/25  3. DURATION: 2-3 days  4. PATTERN:  Intermittent  5. TAP: Regular  6. HEART RATE: 92  7. RECURRENT SYMPTOM: Yes, it goes away on it's own.  8. CAUSE: Unsure.  9. CARDIAC HISTORY: No known heart disease.  10. OTHER SYMPTOMS: No pain, but mild dizziness and did have some mild shortness of breath this morning.  11. PREGNANCY: No    Scheduled to see Dr. Vides today, 3/5/25 @ 10 am.    AARTI QuezadaN, RN, CCM  RN Care Coordinator  [facility]  03/05/25  8:51 AM  Phone: [phone]    Protocols used: Heart Rate and Heartbeat Kotrrtiwq-C-GY

## 2025-03-05 NOTE — NURSING NOTE
Penelope  55 year old    Chief Complaint   Patient presents with    Palpitations     See triage call from this AM (25) - has history of episodes of palpitations but seemed to be worse today, as he felt dizzy and short of breath while his heart is racing. Started when shoveling this AM, seems to have improved since then. He mentions his mother has hx of paroxymal atrial fibrillation             Blood pressure 117/83, pulse 92, temperature 98.2  F (36.8  C), temperature source Skin, resp. rate 18, weight 70.3 kg (155 lb), SpO2 100%. Body mass index is 23.55 kg/m .    Patient Active Problem List   Diagnosis    SUDHIR (obstructive sleep apnea)              Wt Readings from Last 2 Encounters:   25 70.3 kg (155 lb)   24 69.9 kg (154 lb)       BP Readings from Last 3 Encounters:   25 117/83   24 120/74   24 115/68                Current Outpatient Medications   Medication Sig Dispense Refill    EPINEPHrine (ANY BX GENERIC EQUIV) 0.3 MG/0.3ML injection 2-pack Inject 0.3 mLs (0.3 mg) into the muscle once as needed for anaphylaxis 0.6 mL 1    Loratadine (CLARITIN PO) Take 1 tablet by mouth as needed       No current facility-administered medications for this visit.              Social History     Tobacco Use    Smoking status: Former     Current packs/day: 0.00     Average packs/day: 0.3 packs/day for 18.0 years (5.4 ttl pk-yrs)     Types: Cigarettes     Start date: 1987     Quit date: 2005     Years since quittin.1    Smokeless tobacco: Never   Vaping Use    Vaping status: Never Used   Substance Use Topics    Alcohol use: Yes     Alcohol/week: 7.0 - 14.0 standard drinks of alcohol     Types: 7 - 14 Standard drinks or equivalent per week    Drug use: Not Currently     Types: Marijuana     Comment: rare THC use              Health Maintenance Due   Topic Date Due    HEPATITIS B IMMUNIZATION (1 of 3 - 19+ 3-dose series) Never done    Pneumococcal Vaccine: 50+ Years (1 of 1 - PCV)  "Never done    INFLUENZA VACCINE (1) 09/01/2024    COVID-19 Vaccine (5 - 2024-25 season) 09/01/2024    PHQ-2 (once per calendar year)  01/01/2025            No results found for: \"PAP\"           March 5, 2025 9:57 AM    "

## 2025-03-10 DIAGNOSIS — R73.09 ELEVATED GLUCOSE LEVEL: Primary | ICD-10-CM

## 2025-03-16 NOTE — PROGRESS NOTES
Chief complaint: Palpitations    HPI: Penelope Is a 55-year-old here today for the above.  He called this morning and here is a copy of the nurse triage note:    Reason for Disposition   Palpitations are a chronic symptom (recurrent or ongoing AND present > 4 weeks)    Answer Assessment - Initial Assessment Questions  1. DESCRIPTION: Fast and beating hard  2. ONSET: Yesterday, 3/4/25  3. DURATION: 2-3 days  4. PATTERN:  Intermittent  5. TAP: Regular  6. HEART RATE: 92  7. RECURRENT SYMPTOM: Yes, it goes away on it's own.  8. CAUSE: Unsure.  9. CARDIAC HISTORY: No known heart disease.  10. OTHER SYMPTOMS: No pain, but mild dizziness and did have some mild shortness of breath this morning.  11. PREGNANCY: No    Scheduled to see Dr. Vides today, 3/5/25 @ 10 am.      These palpitations started to occur before he went outside to shovel.  They have occurred before but never like today.  When he went outside, he felt dizzy and short of breath and can tell that his heart was racing.  He is gotten better since he stopped trying to show level.    He denies any caffeine this morning.  No breakfast.  He drank some herbal tea.    Family history positive for paroxysmal atrial fibrillation in his mother.  No other issues in the family.    He did not take any other kinds of stimulants today.  No recreational drugs.  He drinks alcohol minimally.    Medications reviewed.  He really is not on anything.  He uses occasional loratadine.  He has an EpiPen if needed for anaphylaxis but did not use that today.    Active medical problems: Reviewed    Current medications: Reviewed    Objective:    TetsuyaIs in no distress.  Affect upbeat.  Breathing comfortably.  No cyanosis.  No diaphoresis.  No tachypnea.  No accessory muscle use.    Heart reveals regular rate and rhythm without murmur.  No ectopy detected during today's visit.    EKG showed normal sinus rhythm with no ectopy.  Rhythm strip also revealed completely normal rhythm.  No  ectopy.    Laboratory studies will consist of the following: Basic metabolic panel, magnesium, and TSH.      Assessment/Plan:    Penelope is a 55-year-old who experienced significant palpitations this morning that made him feel a bit dizzy and lightheaded.  There is a strong family history of paroxysmal atrial fibrillation.  Given his young age, his PPR2NT2-ITIn 2 score is so low that he does not need to be on anticoagulation at this point, even if he did have A-fib.    Zio patch has been applied.  He will wear this for 14 days and then return it.  Upon return, I will wait for cardiology to analyze the data and then I will be in touch with him.  It is quite possible that he had PSVT, but we will wait to see what that shows.  Based on the results, we can determine whether or not a referral to EP cardiology would be appropriate.    Penelope completely agrees with the above plan.      The longitudinal plan of care for the diagnosis(es)/condition(s) as documented were addressed during this visit. Due to the added complexity in care, I will continue to support Penelope in the subsequent management and with ongoing continuity of care.

## 2025-03-24 LAB — CV ZIO PRELIM RESULTS: NORMAL

## 2025-04-04 ENCOUNTER — OFFICE VISIT (OUTPATIENT)
Dept: FAMILY MEDICINE | Facility: CLINIC | Age: 55
End: 2025-04-04

## 2025-04-04 DIAGNOSIS — R00.2 PALPITATIONS: Primary | ICD-10-CM

## 2025-04-17 NOTE — TELEPHONE ENCOUNTER
RECORDS RECEIVED FROM:    DATE RECEIVED:    NOTES STATUS DETAILS   OFFICE NOTE from referring provider  Internal Dr. Vides   OFFICE NOTE from other cardiologists  N/A    RECORDS from hospital/ED N/A    MEDICATION LIST Internal    GENERAL CARDIO RECORDS   (ALL APPOINTMENT TYPES)     LABS (CBC,BMP,CMP, TSH) Internal    EKG (STRIPS & REPORTS) Internal 3/5/25   MONITORS (STRIPS & REPORTS) Internal 3/24/25   ECHOS (IMAGES AND REPORTS) N/A    STRESS TESTS (IMAGES AND REPORTS) N/A    cMRI (IMAGES AND REPORTS) N/A    Cardiac cath (IMAGES AND REPORTS) N/A    CT/CTA (IMAGES AND REPORTS) N/A

## 2025-07-15 ENCOUNTER — PRE VISIT (OUTPATIENT)
Dept: CARDIOLOGY | Facility: CLINIC | Age: 55
End: 2025-07-15
Payer: COMMERCIAL

## 2025-07-15 ENCOUNTER — OFFICE VISIT (OUTPATIENT)
Dept: CARDIOLOGY | Facility: CLINIC | Age: 55
End: 2025-07-15
Attending: INTERNAL MEDICINE
Payer: COMMERCIAL

## 2025-07-15 VITALS
OXYGEN SATURATION: 98 % | WEIGHT: 154.9 LBS | HEART RATE: 63 BPM | BODY MASS INDEX: 23.53 KG/M2 | SYSTOLIC BLOOD PRESSURE: 121 MMHG | DIASTOLIC BLOOD PRESSURE: 84 MMHG

## 2025-07-15 DIAGNOSIS — R00.2 PALPITATIONS: Primary | ICD-10-CM

## 2025-07-15 PROCEDURE — 93005 ELECTROCARDIOGRAM TRACING: CPT

## 2025-07-15 PROCEDURE — 3079F DIAST BP 80-89 MM HG: CPT | Performed by: INTERNAL MEDICINE

## 2025-07-15 PROCEDURE — 1126F AMNT PAIN NOTED NONE PRSNT: CPT | Performed by: INTERNAL MEDICINE

## 2025-07-15 PROCEDURE — 99204 OFFICE O/P NEW MOD 45 MIN: CPT | Performed by: INTERNAL MEDICINE

## 2025-07-15 PROCEDURE — 3074F SYST BP LT 130 MM HG: CPT | Performed by: INTERNAL MEDICINE

## 2025-07-15 PROCEDURE — 99213 OFFICE O/P EST LOW 20 MIN: CPT | Performed by: INTERNAL MEDICINE

## 2025-07-15 ASSESSMENT — PAIN SCALES - GENERAL: PAINLEVEL_OUTOF10: NO PAIN (0)

## 2025-07-15 NOTE — NURSING NOTE
Chief Complaint   Patient presents with    Follow Up     NEW EP       Vitals were taken, medications reconciled and EKG performed.    Rima Dennis, EMT    12:10 PM

## 2025-07-15 NOTE — LETTER
7/15/2025      RE: Penelope Capone  [address]  [facility] [address]       Dear Colleague,    Thank you for the opportunity to participate in the care of your patient, Penelope Capone, at the [facility] [place] [facility]. Please see a copy of my visit note below.    HPI:   Penelope Capone is a very pleasant 55-year-old [facility]  who is being seen today for evaluation of palpitations.  He is followed by Dr.Jon Vides and Dr Cyndy Louie.    Patient indicates that he has had issues with periods in which he feels his heart is racing.  On 1 occasion during the winter when he was out preparing to shovel snow he felt somewhat faint and was seen in the medical clinic.  Zio patch was ultimately placed which showed only PVCs and PACs but were not associated with his usual symptoms.    Mr. Capone is without any cardiac symptoms of excessive shortness of breath or chest pain.  His family history is positive for atrial fibrillation in his mother.  His ECG today showed sinus rhythm with possible left atrial enlargement but otherwise was normal.    At today's visit I recommended that we undertake some basic blood tests including thyroid function and also obtain an echocardiogram to ensure no evidence of underlying structural heart disease.  I have also recommended that he use a Kardia device associated with his iPhone in order to try and capture his palpitations which seem to occur too infrequently for usual monitoring systems.  ILR could be helpful in this circumstance but given the low risk associated with his history it would not seem to be necessary step at this time.    We will review the echo and blood test by video visit in a few months time and by then hopefully have some recordings from his Kardia device.  Mr. Capone voiced understanding and agreement with the plan.    PAST MEDICAL HISTORY:  Past Medical History:   Diagnosis  Date     Dysplastic nevus          Latent tuberculosis     Tested for immigration . Received 6-9 months of treatment with [facility] ~     SUDHIR (obstructive sleep apnea)      Sensory hearing loss, unilateral     Left     Tinea cruris      Urticaria        CURRENT MEDICATIONS:  Current Outpatient Medications   Medication Sig Dispense Refill     Loratadine (CLARITIN PO) Take 1 tablet by mouth as needed       EPINEPHrine (ANY BX GENERIC EQUIV) 0.3 MG/0.3ML injection 2-pack Inject 0.3 mLs (0.3 mg) into the muscle once as needed for anaphylaxis (Patient not taking: Reported on 7/15/2025) 0.6 mL 1       PAST SURGICAL HISTORY:  Past Surgical History:   Procedure Laterality Date     COLONOSCOPY N/A 2018    Procedure: COLONOSCOPY;  colonoscopy;  Surgeon: Antonia Adam MD;  Location: [facility] OR     VASECTOMY  2023       ALLERGIES:     Allergies   Allergen Reactions     Shellfish Allergy Hives     Adhesive Tape Itching and Rash       FAMILY HISTORY:  Family History   Problem Relation Age of Onset     Back Pain Mother      Atrial fibrillation Mother      Coronary Artery Disease Father 65        MI and PCI     Alzheimer Disease Father      No Known Problems Sister      Colon Cancer No family hx of      Prostate Cancer No family hx of      SOCIAL HISTORY:  Social History     Tobacco Use     Smoking status: Former     Current packs/day: 0.00     Average packs/day: 0.3 packs/day for 18.0 years (5.4 ttl pk-yrs)     Types: Cigarettes     Start date: 1987     Quit date: 2005     Years since quittin.5     Smokeless tobacco: Never   Vaping Use     Vaping status: Never Used   Substance Use Topics     Alcohol use: Yes     Alcohol/week: 7.0 - 14.0 standard drinks of alcohol     Types: 7 - 14 Standard drinks or equivalent per week     Drug use: Not Currently     Types: Marijuana     Comment: rare THC use       ROS:   Constitutional: No fever, chills, or sweats. Weight stable.   ENT: No visual disturbance,  ear ache, epistaxis, sore throat.   Cardiovascular: As per HPI.   Respiratory: No cough, hemoptysis.    GI: No nausea, vomiting,   : No hematuria.   Integument: Negative.   Psychiatric: Negative.   Hematologic:   no easy bleeding.  Neuro: Negative.   Endocrinology: No significant heat or cold intolerance   Musculoskeletal: No myalgia.    Exam:  /84 (BP Location: Right arm, Patient Position: Chair, Cuff Size: Adult Regular)   Pulse 63   Wt 70.3 kg (154 lb 14.4 oz)   SpO2 98%   BMI 23.53 kg/m    GENERAL APPEARANCE: healthy, alert and no distress  HEENT: no icterus, no xanthelasmas, , no central cyanosis  NECK: no adenopathy, no asymmetry, masses, or scars, thyroid normal to palpation and no bruits, JVP not elevated  RESPIRATORY: lungs clear to auscultation - no rales, rhonchi or wheezes, no use of accessory muscles, no retractions, respirations are unlabored, normal respiratory rate  CARDIOVASCULAR: regular rhythm, normal S1 with physiologic split S2, no S3 or S4 and no murmur, click or rub, precordium quiet with normal PMI.  ABDOMEN: soft, non tender,  EXTREMITIES: peripheral pulses normal, no edema, no bruits  NEURO: alert and oriented to person/place/time, normal speech, gait and affect  SKIN: no ecchymoses, no rashes    Labs:  CBC RESULTS:   Lab Results   Component Value Date    WBC 4.2 08/16/2024    RBC 4.37 (L) 08/16/2024    HGB 14.4 08/16/2024    HCT 42.3 08/16/2024    MCV 97 08/16/2024    MCH 33.0 08/16/2024    MCHC 34.0 08/16/2024    RDW 11.7 08/16/2024     08/16/2024       BMP RESULTS:  Lab Results   Component Value Date     03/05/2025    POTASSIUM 5.0 03/05/2025    POTASSIUM 3.8 04/25/2022    CHLORIDE 100 03/05/2025    CHLORIDE 103 04/25/2022    CO2 27 03/05/2025    CO2 30 04/25/2022    ANIONGAP 11 03/05/2025    ANIONGAP 7 04/25/2022     (H) 03/05/2025     (H) 06/08/2022    .0 07/14/2014    BUN 12.2 03/05/2025    BUN 20 04/25/2022    CR 0.92 03/05/2025     "GFRESTIMATED >90 03/05/2025    DIGNA 10.2 03/05/2025        INR RESULTS:  No results found for: \"INR\"    Procedures:  PULMONARY FUNCTION TESTS:       Latest Ref Rng & Units 3/29/2017    12:42 PM   PFT   FVC L 4.35    FEV1 L 3.44    FVC% % 101    FEV1% % 99          ECHOCARDIOGRAM:   No results found for this or any previous visit (from the past 8760 hours).      Assessment and Plan:   1.  Palpitations of uncertain etiology-no other cardiovascular complaints at the present time.  Risk is associated with cardiac structural status which is currently unknown    Plan  1.  Obtain echocardiogram to assess for structural heart disease  2.  CBC BMP and thyroid function  3.  Video follow-up 3 to 4 months    Total elapsed time with chart review, clinic visit and documentation 45 minutes    I very much appreciated the opportunity to see and assess Penelope Capone in the clinic today. Please do not hesitate to contact my office if you have any questions or concerns.      Dmitriy Walker MD  Cardiac Arrhythmia Service  [facility]  [phone]       CC  [place]    Please do not hesitate to contact me if you have any questions/concerns.     Sincerely,     Dmitriy Walker MD  "

## 2025-07-15 NOTE — PROGRESS NOTES
HPI:   Penelope Capone is a very pleasant 55-year-old [facility]  who is being seen today for evaluation of palpitations.  He is followed by Dr.Jon Vides and Dr Cyndy Louie.    Patient indicates that he has had issues with periods in which he feels his heart is racing.  On 1 occasion during the winter when he was out preparing to shovel snow he felt somewhat faint and was seen in the medical clinic.  Zio patch was ultimately placed which showed only PVCs and PACs but were not associated with his usual symptoms.    Mr. Capone is without any cardiac symptoms of excessive shortness of breath or chest pain.  His family history is positive for atrial fibrillation in his mother.  His ECG today showed sinus rhythm with possible left atrial enlargement but otherwise was normal.    At today's visit I recommended that we undertake some basic blood tests including thyroid function and also obtain an echocardiogram to ensure no evidence of underlying structural heart disease.  I have also recommended that he use a Kardia device associated with his iPhone in order to try and capture his palpitations which seem to occur too infrequently for usual monitoring systems.  ILR could be helpful in this circumstance but given the low risk associated with his history it would not seem to be necessary step at this time.    We will review the echo and blood test by video visit in a few months time and by then hopefully have some recordings from his Kardia device.  Mr. Capone voiced understanding and agreement with the plan.    PAST MEDICAL HISTORY:  Past Medical History:   Diagnosis Date    Dysplastic nevus     2015    Latent tuberculosis     Tested for immigration 1994. Received 6-9 months of treatment with [facility] ~2004    SUDHIR (obstructive sleep apnea)     Sensory hearing loss, unilateral     Left    Tinea cruris     Urticaria        CURRENT MEDICATIONS:  Current Outpatient Medications   Medication Sig  Dispense Refill    Loratadine (CLARITIN PO) Take 1 tablet by mouth as needed      EPINEPHrine (ANY BX GENERIC EQUIV) 0.3 MG/0.3ML injection 2-pack Inject 0.3 mLs (0.3 mg) into the muscle once as needed for anaphylaxis (Patient not taking: Reported on 7/15/2025) 0.6 mL 1       PAST SURGICAL HISTORY:  Past Surgical History:   Procedure Laterality Date    COLONOSCOPY N/A 2018    Procedure: COLONOSCOPY;  colonoscopy;  Surgeon: Antonia Adam MD;  Location: [facility] OR    VASECTOMY  2023       ALLERGIES:     Allergies   Allergen Reactions    Shellfish Allergy Hives    Adhesive Tape Itching and Rash       FAMILY HISTORY:  Family History   Problem Relation Age of Onset    Back Pain Mother     Atrial fibrillation Mother     Coronary Artery Disease Father 65        MI and PCI    Alzheimer Disease Father     No Known Problems Sister     Colon Cancer No family hx of     Prostate Cancer No family hx of      SOCIAL HISTORY:  Social History     Tobacco Use    Smoking status: Former     Current packs/day: 0.00     Average packs/day: 0.3 packs/day for 18.0 years (5.4 ttl pk-yrs)     Types: Cigarettes     Start date: 1987     Quit date: 2005     Years since quittin.5    Smokeless tobacco: Never   Vaping Use    Vaping status: Never Used   Substance Use Topics    Alcohol use: Yes     Alcohol/week: 7.0 - 14.0 standard drinks of alcohol     Types: 7 - 14 Standard drinks or equivalent per week    Drug use: Not Currently     Types: Marijuana     Comment: rare THC use       ROS:   Constitutional: No fever, chills, or sweats. Weight stable.   ENT: No visual disturbance, ear ache, epistaxis, sore throat.   Cardiovascular: As per HPI.   Respiratory: No cough, hemoptysis.    GI: No nausea, vomiting,   : No hematuria.   Integument: Negative.   Psychiatric: Negative.   Hematologic:   no easy bleeding.  Neuro: Negative.   Endocrinology: No significant heat or cold intolerance   Musculoskeletal: No myalgia.    Exam:  /84  "(BP Location: Right arm, Patient Position: Chair, Cuff Size: Adult Regular)   Pulse 63   Wt 70.3 kg (154 lb 14.4 oz)   SpO2 98%   BMI 23.53 kg/m    GENERAL APPEARANCE: healthy, alert and no distress  HEENT: no icterus, no xanthelasmas, , no central cyanosis  NECK: no adenopathy, no asymmetry, masses, or scars, thyroid normal to palpation and no bruits, JVP not elevated  RESPIRATORY: lungs clear to auscultation - no rales, rhonchi or wheezes, no use of accessory muscles, no retractions, respirations are unlabored, normal respiratory rate  CARDIOVASCULAR: regular rhythm, normal S1 with physiologic split S2, no S3 or S4 and no murmur, click or rub, precordium quiet with normal PMI.  ABDOMEN: soft, non tender,  EXTREMITIES: peripheral pulses normal, no edema, no bruits  NEURO: alert and oriented to person/place/time, normal speech, gait and affect  SKIN: no ecchymoses, no rashes    Labs:  CBC RESULTS:   Lab Results   Component Value Date    WBC 4.2 08/16/2024    RBC 4.37 (L) 08/16/2024    HGB 14.4 08/16/2024    HCT 42.3 08/16/2024    MCV 97 08/16/2024    MCH 33.0 08/16/2024    MCHC 34.0 08/16/2024    RDW 11.7 08/16/2024     08/16/2024       BMP RESULTS:  Lab Results   Component Value Date     03/05/2025    POTASSIUM 5.0 03/05/2025    POTASSIUM 3.8 04/25/2022    CHLORIDE 100 03/05/2025    CHLORIDE 103 04/25/2022    CO2 27 03/05/2025    CO2 30 04/25/2022    ANIONGAP 11 03/05/2025    ANIONGAP 7 04/25/2022     (H) 03/05/2025     (H) 06/08/2022    .0 07/14/2014    BUN 12.2 03/05/2025    BUN 20 04/25/2022    CR 0.92 03/05/2025    GFRESTIMATED >90 03/05/2025    DIGNA 10.2 03/05/2025        INR RESULTS:  No results found for: \"INR\"    Procedures:  PULMONARY FUNCTION TESTS:       Latest Ref Rng & Units 3/29/2017    12:42 PM   PFT   FVC L 4.35    FEV1 L 3.44    FVC% % 101    FEV1% % 99          ECHOCARDIOGRAM:   No results found for this or any previous visit (from the past 9660 " hours).      Assessment and Plan:   1.  Palpitations of uncertain etiology-no other cardiovascular complaints at the present time.  Risk is associated with cardiac structural status which is currently unknown    Plan  1.  Obtain echocardiogram to assess for structural heart disease  2.  CBC BMP and thyroid function  3.  Video follow-up 3 to 4 months    Total elapsed time with chart review, clinic visit and documentation 45 minutes    I very much appreciated the opportunity to see and assess Penelope Capone in the clinic today. Please do not hesitate to contact my office if you have any questions or concerns.      Dmitriy Walker MD  Cardiac Arrhythmia Service  [facility]  [phone]       CC  HANG COY

## 2025-07-15 NOTE — PATIENT INSTRUCTIONS
Plan:    I will order an Echocardiogram, CBC, BMP, and TSH  Follow up with me virtual visit in 4 months      Your Care Team:  [facility]   Telephone Number     Maximino Medrano RN [phone]    After business hours: [phone], ask for cardiologist on-call   Non-procedure scheduling:    Erin SAHU   [phone]   Procedure scheduling:    Breanna Warren   [phone]   Device Clinic (Pacemakers, ICDs, Loop Recorders)    During business hours: [phone]  After business hours:   [phone]- select option 4 and ask for job code [id number].       Cardiovascular Clinic:   [address] [place] [address]. [place] [address]      As always, thank you for trusting us with your health care needs!    If you have further questions, please utilize [employer] to contact us.

## 2025-07-16 LAB
ATRIAL RATE - MUSE: 60 BPM
DIASTOLIC BLOOD PRESSURE - MUSE: NORMAL MMHG
INTERPRETATION ECG - MUSE: NORMAL
P AXIS - MUSE: 60 DEGREES
PR INTERVAL - MUSE: 192 MS
QRS DURATION - MUSE: 90 MS
QT - MUSE: 392 MS
QTC - MUSE: 392 MS
R AXIS - MUSE: 67 DEGREES
SYSTOLIC BLOOD PRESSURE - MUSE: NORMAL MMHG
T AXIS - MUSE: 31 DEGREES
VENTRICULAR RATE- MUSE: 60 BPM

## 2025-07-28 ENCOUNTER — LAB (OUTPATIENT)
Dept: LAB | Facility: CLINIC | Age: 55
End: 2025-07-28
Payer: COMMERCIAL

## 2025-07-28 ENCOUNTER — ANCILLARY PROCEDURE (OUTPATIENT)
Dept: CARDIOLOGY | Facility: CLINIC | Age: 55
End: 2025-07-28
Payer: COMMERCIAL

## 2025-07-28 DIAGNOSIS — R00.2 PALPITATIONS: Primary | ICD-10-CM

## 2025-07-28 DIAGNOSIS — R79.89 ELEVATED FERRITIN: ICD-10-CM

## 2025-07-28 DIAGNOSIS — R00.2 PALPITATIONS: ICD-10-CM

## 2025-07-28 DIAGNOSIS — R73.09 ELEVATED GLUCOSE LEVEL: ICD-10-CM

## 2025-07-28 LAB
ANION GAP SERPL CALCULATED.3IONS-SCNC: 12 MMOL/L (ref 7–15)
BUN SERPL-MCNC: 20.1 MG/DL (ref 6–20)
CALCIUM SERPL-MCNC: 9.3 MG/DL (ref 8.8–10.4)
CHLORIDE SERPL-SCNC: 100 MMOL/L (ref 98–107)
CREAT SERPL-MCNC: 0.95 MG/DL (ref 0.67–1.17)
EGFRCR SERPLBLD CKD-EPI 2021: >90 ML/MIN/1.73M2
ERYTHROCYTE [DISTWIDTH] IN BLOOD BY AUTOMATED COUNT: 11.5 % (ref 10–15)
EST. AVERAGE GLUCOSE BLD GHB EST-MCNC: 111 MG/DL
FERRITIN SERPL-MCNC: 470 NG/ML (ref 31–409)
GLUCOSE SERPL-MCNC: 125 MG/DL (ref 70–99)
HBA1C MFR BLD: 5.5 %
HCO3 SERPL-SCNC: 25 MMOL/L (ref 22–29)
HCT VFR BLD AUTO: 40.8 % (ref 40–53)
HGB BLD-MCNC: 14.3 G/DL (ref 13.3–17.7)
IRON BINDING CAPACITY (ROCHE): 312 UG/DL (ref 240–430)
IRON SATN MFR SERPL: 41 % (ref 15–46)
IRON SERPL-MCNC: 129 UG/DL (ref 61–157)
LVEF ECHO: NORMAL
MCH RBC QN AUTO: 32.9 PG (ref 26.5–33)
MCHC RBC AUTO-ENTMCNC: 35 G/DL (ref 31.5–36.5)
MCV RBC AUTO: 94 FL (ref 78–100)
PLATELET # BLD AUTO: 167 10E3/UL (ref 150–450)
POTASSIUM SERPL-SCNC: 3.9 MMOL/L (ref 3.4–5.3)
RBC # BLD AUTO: 4.34 10E6/UL (ref 4.4–5.9)
SODIUM SERPL-SCNC: 137 MMOL/L (ref 135–145)
TSH SERPL DL<=0.005 MIU/L-ACNC: 1.12 UIU/ML (ref 0.3–4.2)
WBC # BLD AUTO: 4.9 10E3/UL (ref 4–11)

## 2025-07-28 PROCEDURE — 99000 SPECIMEN HANDLING OFFICE-LAB: CPT | Performed by: PATHOLOGY

## 2025-07-28 PROCEDURE — 82728 ASSAY OF FERRITIN: CPT | Performed by: PATHOLOGY

## 2025-07-28 PROCEDURE — 80048 BASIC METABOLIC PNL TOTAL CA: CPT | Performed by: PATHOLOGY

## 2025-07-28 PROCEDURE — 84443 ASSAY THYROID STIM HORMONE: CPT | Performed by: PATHOLOGY

## 2025-07-28 PROCEDURE — 85027 COMPLETE CBC AUTOMATED: CPT | Performed by: PATHOLOGY

## 2025-07-28 PROCEDURE — 83550 IRON BINDING TEST: CPT | Performed by: PATHOLOGY

## 2025-07-28 PROCEDURE — 36415 COLL VENOUS BLD VENIPUNCTURE: CPT | Performed by: PATHOLOGY

## 2025-07-28 PROCEDURE — 93306 TTE W/DOPPLER COMPLETE: CPT | Performed by: INTERNAL MEDICINE

## 2025-07-28 PROCEDURE — 83540 ASSAY OF IRON: CPT | Performed by: PATHOLOGY

## 2025-07-28 PROCEDURE — 83036 HEMOGLOBIN GLYCOSYLATED A1C: CPT | Performed by: FAMILY MEDICINE

## 2025-07-29 ENCOUNTER — RESULTS FOLLOW-UP (OUTPATIENT)
Dept: FAMILY MEDICINE | Facility: CLINIC | Age: 55
End: 2025-07-29

## 2025-07-29 DIAGNOSIS — R73.09 ELEVATED GLUCOSE LEVEL: Primary | ICD-10-CM

## 2025-07-29 DIAGNOSIS — R00.2 PALPITATIONS: ICD-10-CM

## 2025-08-13 ENCOUNTER — LAB (OUTPATIENT)
Dept: LAB | Facility: CLINIC | Age: 55
End: 2025-08-13
Payer: COMMERCIAL

## 2025-08-13 DIAGNOSIS — R00.2 PALPITATIONS: ICD-10-CM

## 2025-08-13 DIAGNOSIS — R73.09 ELEVATED GLUCOSE LEVEL: ICD-10-CM

## 2025-08-13 LAB
BUN SERPL-MCNC: 15.3 MG/DL (ref 6–20)
FASTING STATUS PATIENT QL REPORTED: YES
GLUCOSE SERPL-MCNC: 111 MG/DL (ref 70–99)

## 2025-08-13 PROCEDURE — 36415 COLL VENOUS BLD VENIPUNCTURE: CPT | Performed by: PATHOLOGY

## 2025-08-13 PROCEDURE — 82947 ASSAY GLUCOSE BLOOD QUANT: CPT | Performed by: PATHOLOGY

## 2025-08-13 PROCEDURE — 84520 ASSAY OF UREA NITROGEN: CPT | Performed by: PATHOLOGY

## 2025-09-03 ENCOUNTER — MYC MEDICAL ADVICE (OUTPATIENT)
Dept: CARDIOLOGY | Facility: CLINIC | Age: 55
End: 2025-09-03
Payer: COMMERCIAL

## 2025-09-03 DIAGNOSIS — R00.2 PALPITATIONS: Primary | ICD-10-CM

## (undated) DEVICE — ENDO CAP AND TUBING STERILE FOR ENDOGATOR  100130

## (undated) DEVICE — SUCTION MANIFOLD NEPTUNE 2 SYS 4 PORT 0702-020-000

## (undated) DEVICE — ENDO FORCEP ENDOJAW BIOPSY 2.8MMX230CM FB-220U

## (undated) DEVICE — SOL WATER IRRIG 1000ML BOTTLE 2F7114

## (undated) DEVICE — TAPE DURAPORE 3" SILK 1538-3

## (undated) DEVICE — SPECIMEN CONTAINER 3OZ W/FORMALIN 59901

## (undated) DEVICE — WIPE PREMOIST CLEANSING WASHCLOTHS 7988

## (undated) DEVICE — ENDO CONNECTOR ENDOGATOR AUX WATER JET FOR OLYMPUS SCOPE

## (undated) RX ORDER — FENTANYL CITRATE 50 UG/ML
INJECTION, SOLUTION INTRAMUSCULAR; INTRAVENOUS
Status: DISPENSED
Start: 2018-01-23